# Patient Record
Sex: FEMALE | Race: WHITE | NOT HISPANIC OR LATINO | Employment: OTHER | URBAN - METROPOLITAN AREA
[De-identification: names, ages, dates, MRNs, and addresses within clinical notes are randomized per-mention and may not be internally consistent; named-entity substitution may affect disease eponyms.]

---

## 2021-08-11 ENCOUNTER — OFFICE VISIT (OUTPATIENT)
Dept: URGENT CARE | Facility: CLINIC | Age: 76
End: 2021-08-11
Payer: MEDICARE

## 2021-08-11 ENCOUNTER — APPOINTMENT (OUTPATIENT)
Dept: RADIOLOGY | Facility: CLINIC | Age: 76
End: 2021-08-11
Payer: MEDICARE

## 2021-08-11 VITALS
DIASTOLIC BLOOD PRESSURE: 81 MMHG | BODY MASS INDEX: 23.46 KG/M2 | RESPIRATION RATE: 18 BRPM | WEIGHT: 146 LBS | OXYGEN SATURATION: 97 % | TEMPERATURE: 96.8 F | SYSTOLIC BLOOD PRESSURE: 182 MMHG | HEIGHT: 66 IN | HEART RATE: 69 BPM

## 2021-08-11 DIAGNOSIS — S76.211A STRAIN OF GROIN, RIGHT, INITIAL ENCOUNTER: Primary | ICD-10-CM

## 2021-08-11 DIAGNOSIS — S39.93XA PELVIC INJURY, INITIAL ENCOUNTER: ICD-10-CM

## 2021-08-11 PROCEDURE — 99203 OFFICE O/P NEW LOW 30 MIN: CPT | Performed by: PHYSICIAN ASSISTANT

## 2021-08-11 PROCEDURE — 73502 X-RAY EXAM HIP UNI 2-3 VIEWS: CPT

## 2021-08-11 RX ORDER — LEVOCETIRIZINE DIHYDROCHLORIDE 2.5 MG/5ML
2.5 SOLUTION ORAL EVERY EVENING
COMMUNITY
End: 2021-09-03

## 2021-08-11 RX ORDER — LEVOTHYROXINE SODIUM 0.07 MG/1
75 TABLET ORAL DAILY
COMMUNITY

## 2021-08-11 NOTE — PROGRESS NOTES
[id number] [email or web address]        NAME: Virl Sandifer is a 78 y o  female  : 1945    MRN: [id number]  DATE: 2021  TIME: 5:57 PM    Assessment and Plan   Strain of groin, right, initial encounter [S76 211A]  1  Strain of groin, right, initial encounter  XR hip/pelv 2-3 vws right if performed    CANCELED: XR pelvis complete 3+ vw         Patient Instructions     Patient Instructions   Xray is negative for a fracture or dislocation today  Can apply ice to the are, rest, elevate and take ibuprofen or tylenol for the pain  Do gentle daily stretching to improve strength  Follow up with primary care doctor if pain persists  If symptoms worsen proceed to the ER  Follow up with PCP in 3-5 days  Proceed to  ER if symptoms worsen  Chief Complaint     Chief Complaint   Patient presents with    Pelvic Pain     fall on rt side has rt pelvic pain that prohibits her from normal ambulation  History of Present Illness       77 y/o female presents with right sided hip and groin pain after a mechanical fall about 7 days ago  Pt notes she was walking and abruptly changed direction and tripped over her sandals falling on her right side  Pt notes she did hit her head but not hard  Denies headache, blurred vision, dizziness and does not take blood thinners  Pt notes it is painful to apply pressure  She cannot rotate her hip or lift her leg with out extreme pain  Pt has been using ice with relief  Tylenol does not improve symptoms  Pt tried voltaren gel on the area and noticed an improvement but thought it was in her head  No numbness or tingling in her extremity, no loss of bowel or bladder control  Review of Systems   Review of Systems   Constitutional: Negative for fever  Eyes: Negative for visual disturbance  Respiratory: Negative for cough, chest tightness and shortness of breath  Cardiovascular: Negative for chest pain and palpitations  Genitourinary: Negative for difficulty urinating  Musculoskeletal: Positive for gait problem (pain)  Negative for back pain and neck pain  Right groin pain   Skin: Positive for color change (bruise)  Neurological: Positive for weakness  Negative for dizziness, light-headedness and headaches  Current Medications       Current Outpatient Medications:     buPROPion HCl (WELLBUTRIN PO), Take by mouth, Disp: , Rfl:     levocetirizine (XYZAL) 2 5 MG/5ML solution, Take 2 5 mg by mouth every evening, Disp: , Rfl:     levothyroxine 75 mcg tablet, Take 75 mcg by mouth daily, Disp: , Rfl:     sertraline (ZOLOFT) 50 mg tablet, Take 50 mg by mouth daily, Disp: , Rfl:     Current Allergies     Allergies as of 08/11/2021 - Reviewed 08/11/2021   Allergen Reaction Noted    Cleocin [clindamycin] Hives 08/11/2021            The following portions of the patient's history were reviewed and updated as appropriate: allergies, current medications, past family history, past medical history, past social history, past surgical history and problem list      No past medical history on file  Past Surgical History:   Procedure Laterality Date    ADENOIDECTOMY      APPENDECTOMY      BARIATRIC SURGERY      BILATERAL OOPHORECTOMY      CATARACT EXTRACTION, BILATERAL      CHOLECYSTECTOMY      ELBOW SURGERY      HERNIA REPAIR      HYSTERECTOMY      MASTECTOMY      RESECTION RIBS EXTRAPLEURAL      TONSILLECTOMY         No family history on file  Medications have been verified  Objective   BP (!) 182/81   Pulse 69   Temp (!) 96 8 °F (36 °C)   Resp 18   Ht 5' 6" (1 676 m)   Wt 66 2 kg (146 lb)   SpO2 97%   BMI 23 57 kg/m²        Physical Exam     Physical Exam  Vitals and nursing note reviewed  Constitutional:       Appearance: Normal appearance  HENT:      Head: Normocephalic and atraumatic  Cardiovascular:      Rate and Rhythm: Normal rate and regular rhythm  Pulses: Normal pulses  Heart sounds: Normal heart sounds     Pulmonary: Effort: Pulmonary effort is normal       Breath sounds: Normal breath sounds  Musculoskeletal:         General: Tenderness (deep right groin) present  No swelling  Right upper leg: Tenderness (inguinal region) present  Legs:       Comments: Decreased ROM d/t pain in all directions  Hip flexion 2/5  Hip add/abduction 2/5   N/V intact   Skin:     Findings: Bruising (lateral right thigh) present  Neurological:      Mental Status: She is alert and oriented to person, place, and time

## 2021-08-11 NOTE — PATIENT INSTRUCTIONS
Xray is negative for a fracture or dislocation today  Can apply ice to the are, rest, elevate and take ibuprofen or tylenol for the pain  Do gentle daily stretching to improve strength  Follow up with primary care doctor if pain persists  If symptoms worsen proceed to the ER

## 2021-09-03 ENCOUNTER — OFFICE VISIT (OUTPATIENT)
Dept: OBGYN CLINIC | Facility: CLINIC | Age: 76
End: 2021-09-03
Payer: MEDICARE

## 2021-09-03 VITALS
BODY MASS INDEX: 23.69 KG/M2 | WEIGHT: 147.4 LBS | HEART RATE: 60 BPM | HEIGHT: 66 IN | DIASTOLIC BLOOD PRESSURE: 83 MMHG | SYSTOLIC BLOOD PRESSURE: 157 MMHG

## 2021-09-03 DIAGNOSIS — S76.211A STRAIN OF RIGHT GROIN: Primary | ICD-10-CM

## 2021-09-03 PROCEDURE — 99204 OFFICE O/P NEW MOD 45 MIN: CPT | Performed by: ORTHOPAEDIC SURGERY

## 2021-09-03 RX ORDER — HYOSCYAMINE SULFATE EXTENDED-RELEASE 0.38 MG/1
0.38 TABLET ORAL DAILY
COMMUNITY
Start: 2021-08-27

## 2021-09-03 RX ORDER — CYANOCOBALAMIN 1000 UG/ML
INJECTION INTRAMUSCULAR; SUBCUTANEOUS
COMMUNITY
Start: 2021-08-27

## 2021-09-03 RX ORDER — LEVOCETIRIZINE DIHYDROCHLORIDE 5 MG/1
5 TABLET, FILM COATED ORAL EVERY EVENING
COMMUNITY
Start: 2021-07-11

## 2021-09-03 RX ORDER — BUPROPION HYDROCHLORIDE 300 MG/1
300 TABLET ORAL DAILY
COMMUNITY
Start: 2021-06-10

## 2021-09-03 RX ORDER — ALPRAZOLAM 0.25 MG/1
0.25 TABLET ORAL 3 TIMES DAILY PRN
COMMUNITY
Start: 2021-06-08

## 2021-09-03 NOTE — PROGRESS NOTES
Assessment/Plan:  1  Strain of right groin  MRI hip right wo contrast       Scribe Attestation    I,:  Rosie Ortiz am acting as a scribe while in the presence of the attending physician :       I,:  Cindi Altamirano MD personally performed the services described in this documentation    as scribed in my presence :             Upon evaluation and review of X-ray imaging obtained on 8/11/2021, Kwame Ryan demonstrates with a right groin strain  At this time, I am also suspicious of a hairline fracture  A STAT MRI has been ordered to rule out a hairline fracture, and Kwame Ryan will follow-up with me shortly after to discuss the results  A surgical repair may be needed if a fracture is detected, which will be discussed in further detail with Kwame Ryan if necessary  Subjective:   Farzad Hernandez is a friendly 78 y o  female who presents to the office today for pain in her right groin area, from a fall about 8 weeks ago  She reports making a sharp, unexpected turn while walking when she experienced significant pain in her groin, causing her to fall on 8/04/2021  She was worried about injuring her hip due to her age  She visited a  [facility] location and received X-ray imaging on 8/11/2021, and was told that there was no obvious hip fracture, but a possible strain of a groin muscle  She experienced pain, swelling, and tenderness over the injured area, but noticed no bruising  Kwame Ryan initially had significant pain with standing up/sitting down, driving, and walking, and now is experiencing slight discomfort with walking  She has tried rest, ice, Tylenol, and Voltaren gel for pain relief, and they have worked to an extent  She initially had to use her cane to help with ambulation, but has since weaned off of it  At this time, she is unable to walk faster than normal pace, and experiences pain with walking during the first few steps  Her pain intensity increases throughout the day during activity  Review of Systems   Constitutional: Negative for chills and fever  HENT: Negative for ear pain and sore throat  Eyes: Negative for pain and visual disturbance  Respiratory: Negative for cough and shortness of breath  Cardiovascular: Negative for chest pain and palpitations  Gastrointestinal: Negative for abdominal pain and vomiting  Genitourinary: Negative for dysuria and hematuria  Musculoskeletal: Positive for gait problem (Pain experienced in right groin area with walking, previously used cane for assistance) and myalgias  Negative for arthralgias and back pain  Skin: Negative for color change and rash  Neurological: Negative for seizures and syncope  All other systems reviewed and are negative  Past Medical History:   Diagnosis Date    Depression     Disease of thyroid gland        Past Surgical History:   Procedure Laterality Date    ADENOIDECTOMY      APPENDECTOMY      BARIATRIC SURGERY      BILATERAL OOPHORECTOMY      CATARACT EXTRACTION, BILATERAL      CHOLECYSTECTOMY      ELBOW SURGERY      HERNIA REPAIR      HYSTERECTOMY      MASTECTOMY      RESECTION RIBS EXTRAPLEURAL      TONSILLECTOMY         Family History   Problem Relation Age of Onset    No Known Problems Mother     No Known Problems Father        Social History     Occupational History    Not on file   Tobacco Use    Smoking status: Never Smoker    Smokeless tobacco: Never Used   Vaping Use    Vaping Use: Never used   Substance and Sexual Activity    Alcohol use:  Yes    Drug use: Never    Sexual activity: Not on file         Current Outpatient Medications:     ALPRAZolam (XANAX) 0 25 mg tablet, Take 0 25 mg by mouth 3 (three) times a day as needed, Disp: , Rfl:     buPROPion (WELLBUTRIN XL) 300 mg 24 hr tablet, Take 300 mg by mouth daily, Disp: , Rfl:     cyanocobalamin 1,000 mcg/mL, INJECT 1 ML INTRAMUSCULARLY ONCE EVERY MONTH, Disp: , Rfl:     hyoscyamine (LEVBID) 0 375 mg 12 hr tablet, Take 0 375 mg by mouth daily, Disp: , Rfl:     levocetirizine (XYZAL) 5 MG tablet, Take 5 mg by mouth every evening, Disp: , Rfl:     levothyroxine 75 mcg tablet, Take 75 mcg by mouth daily, Disp: , Rfl:     sertraline (ZOLOFT) 50 mg tablet, Take 50 mg by mouth daily, Disp: , Rfl:     Allergies   Allergen Reactions    Cleocin [Clindamycin] Hives       Objective:  Vitals:    09/03/21 1007   BP: 157/83   Pulse: 60       Right Hip Exam     Tenderness   The patient is experiencing tenderness in the anterior and ischial tuberosity (Sore over iliopsoas tendon upon palpation)  Range of Motion   External rotation: 50 (Less discomfort)   Internal rotation: normal Right hip internal rotation: Soreness passively  Muscle Strength   Flexion: 5/5     Other   Erythema: absent  Sensation: normal  Pulse: present    Comments:  Positive Stinch Field Maneuver      Left Hip Exam   Left hip exam is normal             Physical Exam  Vitals reviewed  HENT:      Head: Normocephalic and atraumatic  Eyes:      General:         Right eye: No discharge  Left eye: No discharge  Extraocular Movements: Extraocular movements intact  Conjunctiva/sclera: Conjunctivae normal    Cardiovascular:      Rate and Rhythm: Normal rate  Pulmonary:      Effort: Pulmonary effort is normal  No respiratory distress  Musculoskeletal:         General: Tenderness and signs of injury present  No swelling  Cervical back: Normal range of motion and neck supple  Comments: As noted in HPI   Skin:     General: Skin is warm and dry  Neurological:      Mental Status: She is alert and oriented to person, place, and time  I have personally reviewed pertinent films in PACS and my interpretation is as follows:  X-ray imaging obtained by a [facility] location indicated no acute osseus abnormality of the right hip, with mild vascular calcification

## 2021-09-07 ENCOUNTER — HOSPITAL ENCOUNTER (OUTPATIENT)
Dept: RADIOLOGY | Facility: HOSPITAL | Age: 76
Discharge: HOME | End: 2021-09-07
Attending: ORTHOPAEDIC SURGERY
Payer: MEDICARE

## 2021-09-07 DIAGNOSIS — S76.211A STRAIN OF RIGHT GROIN: ICD-10-CM

## 2021-09-07 PROCEDURE — G1004 CDSM NDSC: HCPCS

## 2021-09-07 PROCEDURE — 73721 MRI JNT OF LWR EXTRE W/O DYE: CPT

## 2021-09-10 ENCOUNTER — OFFICE VISIT (OUTPATIENT)
Dept: OBGYN CLINIC | Facility: CLINIC | Age: 76
End: 2021-09-10
Payer: MEDICARE

## 2021-09-10 VITALS
HEIGHT: 66 IN | HEART RATE: 59 BPM | SYSTOLIC BLOOD PRESSURE: 157 MMHG | BODY MASS INDEX: 23.63 KG/M2 | WEIGHT: 147 LBS | DIASTOLIC BLOOD PRESSURE: 63 MMHG

## 2021-09-10 DIAGNOSIS — S32.511D CLOSED FRACTURE OF SUPERIOR RAMUS OF RIGHT PUBIS WITH ROUTINE HEALING, SUBSEQUENT ENCOUNTER: Primary | ICD-10-CM

## 2021-09-10 PROCEDURE — 99214 OFFICE O/P EST MOD 30 MIN: CPT | Performed by: ORTHOPAEDIC SURGERY

## 2021-09-10 NOTE — PROGRESS NOTES
Assessment/Plan:  1  Closed fracture of superior ramus of right pubis with routine healing, subsequent encounter         Scribe Attestation    I,:  Camille Esquivel MA am acting as a scribe while in the presence of the attending physician :       I,:  Lila Mueller MD personally performed the services described in this documentation    as scribed in my presence :             The MRI was reviewed with Lilian Salmeron which does demonstrate a nondisplaced subacute superior ramus fracture  I discussed with her that this is a non operative fracture and should heal well with non operative treatment  She is aware this can take 3 months or longer to heal  Patient does not have any pain with hip rotation on exam  She can flex at the hip well  She may continue to be activities as tolerated  She can continue to take Tylenol OTC as needed for pain  She may follow up with me as needed  Subjective:   Anni Marsh is a 78 y o  female who presents to the office today for follow up evaluation right hip pain  This has been ongoing since a fall which was appx 6-8 weeks ago  Patient notes continued pain to her right groin  She states Wednesday and Thursday of this week she was feeling godo in regards to pain however, states she feels as though she over did it as her pain is increased today  She states she was doing a lot of walking recently  Patient notes increased pain with prolonged walking  She has been taking Tylenol OTC for pain  She has also been using ice  Patient states she is unable to take antiinflammatories  A MRI was ordered at her last visit to evaluate for a fracture  Patient presents to the office today to review the MRI  Review of Systems   Constitutional: Negative for chills and fever  HENT: Negative for drooling and sneezing  Eyes: Negative for redness  Respiratory: Negative for cough and wheezing  Gastrointestinal: Negative for nausea and vomiting     Musculoskeletal: Negative for arthralgias, joint swelling and myalgias  Neurological: Negative for weakness and numbness  Psychiatric/Behavioral: Negative for behavioral problems  The patient is not nervous/anxious  Past Medical History:   Diagnosis Date    Depression     Disease of thyroid gland        Past Surgical History:   Procedure Laterality Date    ADENOIDECTOMY      APPENDECTOMY      BARIATRIC SURGERY      BILATERAL OOPHORECTOMY      CATARACT EXTRACTION, BILATERAL      CHOLECYSTECTOMY      ELBOW SURGERY      HERNIA REPAIR      HYSTERECTOMY      MASTECTOMY      RESECTION RIBS EXTRAPLEURAL      TONSILLECTOMY         Family History   Problem Relation Age of Onset    No Known Problems Mother     No Known Problems Father        Social History     Occupational History    Not on file   Tobacco Use    Smoking status: Never Smoker    Smokeless tobacco: Never Used   Vaping Use    Vaping Use: Never used   Substance and Sexual Activity    Alcohol use:  Yes    Drug use: Never    Sexual activity: Not on file         Current Outpatient Medications:     ALPRAZolam (XANAX) 0 25 mg tablet, Take 0 25 mg by mouth 3 (three) times a day as needed, Disp: , Rfl:     buPROPion (WELLBUTRIN XL) 300 mg 24 hr tablet, Take 300 mg by mouth daily, Disp: , Rfl:     cyanocobalamin 1,000 mcg/mL, INJECT 1 ML INTRAMUSCULARLY ONCE EVERY MONTH, Disp: , Rfl:     hyoscyamine (LEVBID) 0 375 mg 12 hr tablet, Take 0 375 mg by mouth daily, Disp: , Rfl:     levocetirizine (XYZAL) 5 MG tablet, Take 5 mg by mouth every evening, Disp: , Rfl:     levothyroxine 75 mcg tablet, Take 75 mcg by mouth daily, Disp: , Rfl:     sertraline (ZOLOFT) 50 mg tablet, Take 50 mg by mouth daily, Disp: , Rfl:     Allergies   Allergen Reactions    Cleocin [Clindamycin] Hives       Objective:  Vitals:    09/10/21 1445   BP: 157/63   Pulse: 59       Right Hip Exam     Tenderness   Right hip tenderness location: fracture site     Muscle Strength   Right hip normal muscle strength: 4+/5 hip flexion  Other   Erythema: absent  Sensation: normal  Pulse: present    Comments:  + straight leg raise  Can flex the hip well  No pain with rotation             Physical Exam  Constitutional:       Appearance: She is well-developed  HENT:      Head: Normocephalic and atraumatic  Eyes:      General:         Right eye: No discharge  Left eye: No discharge  Conjunctiva/sclera: Conjunctivae normal    Cardiovascular:      Rate and Rhythm: Normal rate  Pulmonary:      Effort: Pulmonary effort is normal  No respiratory distress  Musculoskeletal:      Cervical back: Normal range of motion and neck supple  Comments: As noted in HPI   Skin:     General: Skin is warm and dry  Neurological:      Mental Status: She is alert and oriented to person, place, and time  Psychiatric:         Behavior: Behavior normal          Thought Content: Thought content normal          Judgment: Judgment normal          I have personally reviewed pertinent films in PACS and my interpretation is as follows:MRI right hip performed on 9/7/21 demonstrates a nondisplaced subacute superior pubic ramus fracture

## 2021-10-25 ENCOUNTER — APPOINTMENT (OUTPATIENT)
Dept: RADIOLOGY | Facility: CLINIC | Age: 76
End: 2021-10-25
Payer: MEDICARE

## 2021-10-25 ENCOUNTER — OFFICE VISIT (OUTPATIENT)
Dept: OBGYN CLINIC | Facility: CLINIC | Age: 76
End: 2021-10-25
Payer: MEDICARE

## 2021-10-25 VITALS
HEART RATE: 56 BPM | DIASTOLIC BLOOD PRESSURE: 69 MMHG | HEIGHT: 66 IN | WEIGHT: 148 LBS | SYSTOLIC BLOOD PRESSURE: 158 MMHG | BODY MASS INDEX: 23.78 KG/M2

## 2021-10-25 DIAGNOSIS — M25.551 PAIN IN RIGHT HIP: ICD-10-CM

## 2021-10-25 DIAGNOSIS — M70.61 TROCHANTERIC BURSITIS OF RIGHT HIP: Primary | ICD-10-CM

## 2021-10-25 DIAGNOSIS — R10.2 PELVIC PAIN: ICD-10-CM

## 2021-10-25 DIAGNOSIS — M54.31 SCIATICA OF RIGHT SIDE: ICD-10-CM

## 2021-10-25 PROCEDURE — 99214 OFFICE O/P EST MOD 30 MIN: CPT | Performed by: ORTHOPAEDIC SURGERY

## 2021-10-25 PROCEDURE — 73502 X-RAY EXAM HIP UNI 2-3 VIEWS: CPT

## 2021-10-25 PROCEDURE — 20610 DRAIN/INJ JOINT/BURSA W/O US: CPT | Performed by: ORTHOPAEDIC SURGERY

## 2021-10-25 RX ORDER — TRIAMCINOLONE ACETONIDE 40 MG/ML
20 INJECTION, SUSPENSION INTRA-ARTICULAR; INTRAMUSCULAR
Status: COMPLETED | OUTPATIENT
Start: 2021-10-25 | End: 2021-10-25

## 2021-10-25 RX ORDER — LIDOCAINE HYDROCHLORIDE 10 MG/ML
2 INJECTION, SOLUTION INFILTRATION; PERINEURAL
Status: COMPLETED | OUTPATIENT
Start: 2021-10-25 | End: 2021-10-25

## 2021-10-25 RX ADMIN — LIDOCAINE HYDROCHLORIDE 2 ML: 10 INJECTION, SOLUTION INFILTRATION; PERINEURAL at 12:12

## 2021-10-25 RX ADMIN — TRIAMCINOLONE ACETONIDE 20 MG: 40 INJECTION, SUSPENSION INTRA-ARTICULAR; INTRAMUSCULAR at 12:12

## 2021-10-27 ENCOUNTER — EVALUATION (OUTPATIENT)
Dept: PHYSICAL THERAPY | Facility: CLINIC | Age: 76
End: 2021-10-27
Payer: MEDICARE

## 2021-10-27 DIAGNOSIS — M54.16 LUMBAR RADICULOPATHY: ICD-10-CM

## 2021-10-27 DIAGNOSIS — M54.31 SCIATICA OF RIGHT SIDE: Primary | ICD-10-CM

## 2021-10-27 DIAGNOSIS — M70.61 TROCHANTERIC BURSITIS OF RIGHT HIP: ICD-10-CM

## 2021-10-27 PROCEDURE — 97161 PT EVAL LOW COMPLEX 20 MIN: CPT

## 2021-11-01 ENCOUNTER — APPOINTMENT (OUTPATIENT)
Dept: PHYSICAL THERAPY | Facility: CLINIC | Age: 76
End: 2021-11-01
Payer: MEDICARE

## 2021-11-01 ENCOUNTER — TELEPHONE (OUTPATIENT)
Dept: PHYSICAL THERAPY | Facility: CLINIC | Age: 76
End: 2021-11-01

## 2021-11-04 ENCOUNTER — OFFICE VISIT (OUTPATIENT)
Dept: PHYSICAL THERAPY | Facility: CLINIC | Age: 76
End: 2021-11-04
Payer: MEDICARE

## 2021-11-04 DIAGNOSIS — M70.61 TROCHANTERIC BURSITIS OF RIGHT HIP: Primary | ICD-10-CM

## 2021-11-04 DIAGNOSIS — M54.16 LUMBAR RADICULOPATHY: ICD-10-CM

## 2021-11-04 DIAGNOSIS — M54.31 SCIATICA OF RIGHT SIDE: ICD-10-CM

## 2021-11-04 PROCEDURE — 97112 NEUROMUSCULAR REEDUCATION: CPT

## 2021-11-04 PROCEDURE — 97110 THERAPEUTIC EXERCISES: CPT

## 2021-11-08 ENCOUNTER — APPOINTMENT (OUTPATIENT)
Dept: PHYSICAL THERAPY | Facility: CLINIC | Age: 76
End: 2021-11-08
Payer: MEDICARE

## 2021-11-11 ENCOUNTER — OFFICE VISIT (OUTPATIENT)
Dept: PHYSICAL THERAPY | Facility: CLINIC | Age: 76
End: 2021-11-11
Payer: MEDICARE

## 2021-11-11 DIAGNOSIS — M54.16 LUMBAR RADICULOPATHY: ICD-10-CM

## 2021-11-11 DIAGNOSIS — M70.61 TROCHANTERIC BURSITIS OF RIGHT HIP: Primary | ICD-10-CM

## 2021-11-11 DIAGNOSIS — M54.31 SCIATICA OF RIGHT SIDE: ICD-10-CM

## 2021-11-11 PROCEDURE — 97112 NEUROMUSCULAR REEDUCATION: CPT

## 2021-11-11 PROCEDURE — 97110 THERAPEUTIC EXERCISES: CPT

## 2021-11-16 ENCOUNTER — APPOINTMENT (OUTPATIENT)
Dept: PHYSICAL THERAPY | Facility: CLINIC | Age: 76
End: 2021-11-16
Payer: MEDICARE

## 2021-11-18 ENCOUNTER — HOSPITAL ENCOUNTER (EMERGENCY)
Facility: HOSPITAL | Age: 76
Discharge: HOME | End: 2021-11-18
Attending: EMERGENCY MEDICINE | Admitting: EMERGENCY MEDICINE
Payer: MEDICARE

## 2021-11-18 ENCOUNTER — APPOINTMENT (EMERGENCY)
Dept: RADIOLOGY | Facility: HOSPITAL | Age: 76
End: 2021-11-18
Payer: MEDICARE

## 2021-11-18 ENCOUNTER — APPOINTMENT (OUTPATIENT)
Dept: PHYSICAL THERAPY | Facility: CLINIC | Age: 76
End: 2021-11-18
Payer: MEDICARE

## 2021-11-18 VITALS
RESPIRATION RATE: 16 BRPM | HEART RATE: 58 BPM | SYSTOLIC BLOOD PRESSURE: 161 MMHG | DIASTOLIC BLOOD PRESSURE: 70 MMHG | HEIGHT: 66 IN | OXYGEN SATURATION: 98 % | BODY MASS INDEX: 21.86 KG/M2 | TEMPERATURE: 97.4 F | WEIGHT: 136 LBS

## 2021-11-18 DIAGNOSIS — R10.9 ABDOMINAL PAIN: Primary | ICD-10-CM

## 2021-11-18 LAB
ALBUMIN SERPL BCP-MCNC: 3.7 G/DL (ref 3.5–5)
ALP SERPL-CCNC: 96 U/L (ref 46–116)
ALT SERPL W P-5'-P-CCNC: 28 U/L (ref 12–78)
ANION GAP SERPL CALCULATED.3IONS-SCNC: 8 MMOL/L (ref 4–13)
AST SERPL W P-5'-P-CCNC: 21 U/L (ref 5–45)
BASOPHILS # BLD AUTO: 0.05 THOUSANDS/ΜL (ref 0–0.1)
BASOPHILS NFR BLD AUTO: 1 % (ref 0–1)
BILIRUB SERPL-MCNC: 0.23 MG/DL (ref 0.2–1)
BILIRUB UR QL STRIP: NEGATIVE
BUN SERPL-MCNC: 11 MG/DL (ref 5–25)
CALCIUM SERPL-MCNC: 9.3 MG/DL (ref 8.3–10.1)
CHLORIDE SERPL-SCNC: 105 MMOL/L (ref 100–108)
CLARITY UR: ABNORMAL
CO2 SERPL-SCNC: 30 MMOL/L (ref 21–32)
COLOR UR: YELLOW
CREAT SERPL-MCNC: 0.98 MG/DL (ref 0.6–1.3)
EOSINOPHIL # BLD AUTO: 0.11 THOUSAND/ΜL (ref 0–0.61)
EOSINOPHIL NFR BLD AUTO: 2 % (ref 0–6)
ERYTHROCYTE [DISTWIDTH] IN BLOOD BY AUTOMATED COUNT: 16.7 % (ref 11.6–15.1)
GFR SERPL CREATININE-BSD FRML MDRD: 57 ML/MIN/1.73SQ M
GLUCOSE SERPL-MCNC: 134 MG/DL (ref 65–140)
GLUCOSE UR STRIP-MCNC: NEGATIVE MG/DL
HCT VFR BLD AUTO: 38.5 % (ref 34.8–46.1)
HGB BLD-MCNC: 12.1 G/DL (ref 11.5–15.4)
HGB UR QL STRIP.AUTO: NEGATIVE
IMM GRANULOCYTES # BLD AUTO: 0.01 THOUSAND/UL (ref 0–0.2)
IMM GRANULOCYTES NFR BLD AUTO: 0 % (ref 0–2)
KETONES UR STRIP-MCNC: ABNORMAL MG/DL
LEUKOCYTE ESTERASE UR QL STRIP: NEGATIVE
LYMPHOCYTES # BLD AUTO: 1.89 THOUSANDS/ΜL (ref 0.6–4.47)
LYMPHOCYTES NFR BLD AUTO: 34 % (ref 14–44)
MCH RBC QN AUTO: 29.2 PG (ref 26.8–34.3)
MCHC RBC AUTO-ENTMCNC: 31.4 G/DL (ref 31.4–37.4)
MCV RBC AUTO: 93 FL (ref 82–98)
MONOCYTES # BLD AUTO: 0.37 THOUSAND/ΜL (ref 0.17–1.22)
MONOCYTES NFR BLD AUTO: 7 % (ref 4–12)
NEUTROPHILS # BLD AUTO: 3.08 THOUSANDS/ΜL (ref 1.85–7.62)
NEUTS SEG NFR BLD AUTO: 56 % (ref 43–75)
NITRITE UR QL STRIP: NEGATIVE
NRBC BLD AUTO-RTO: 0 /100 WBCS
PH UR STRIP.AUTO: 5.5 [PH]
PLATELET # BLD AUTO: 149 THOUSANDS/UL (ref 149–390)
PMV BLD AUTO: 13 FL (ref 8.9–12.7)
POTASSIUM SERPL-SCNC: 4.5 MMOL/L (ref 3.5–5.3)
PROT SERPL-MCNC: 7.2 G/DL (ref 6.4–8.2)
PROT UR STRIP-MCNC: NEGATIVE MG/DL
RBC # BLD AUTO: 4.14 MILLION/UL (ref 3.81–5.12)
SODIUM SERPL-SCNC: 143 MMOL/L (ref 136–145)
SP GR UR STRIP.AUTO: >=1.03 (ref 1–1.03)
UROBILINOGEN UR QL STRIP.AUTO: 0.2 E.U./DL
WBC # BLD AUTO: 5.51 THOUSAND/UL (ref 4.31–10.16)

## 2021-11-18 PROCEDURE — 85025 COMPLETE CBC W/AUTO DIFF WBC: CPT | Performed by: EMERGENCY MEDICINE

## 2021-11-18 PROCEDURE — 74177 CT ABD & PELVIS W/CONTRAST: CPT

## 2021-11-18 PROCEDURE — 99284 EMERGENCY DEPT VISIT MOD MDM: CPT | Performed by: EMERGENCY MEDICINE

## 2021-11-18 PROCEDURE — 80053 COMPREHEN METABOLIC PANEL: CPT | Performed by: EMERGENCY MEDICINE

## 2021-11-18 PROCEDURE — 99284 EMERGENCY DEPT VISIT MOD MDM: CPT

## 2021-11-18 PROCEDURE — 81003 URINALYSIS AUTO W/O SCOPE: CPT | Performed by: EMERGENCY MEDICINE

## 2021-11-18 PROCEDURE — 36415 COLL VENOUS BLD VENIPUNCTURE: CPT | Performed by: EMERGENCY MEDICINE

## 2021-11-18 PROCEDURE — G1004 CDSM NDSC: HCPCS

## 2021-11-18 RX ADMIN — IOHEXOL 100 ML: 350 INJECTION, SOLUTION INTRAVENOUS at 12:31

## 2021-11-22 ENCOUNTER — APPOINTMENT (OUTPATIENT)
Dept: PHYSICAL THERAPY | Facility: CLINIC | Age: 76
End: 2021-11-22
Payer: MEDICARE

## 2021-11-24 ENCOUNTER — APPOINTMENT (OUTPATIENT)
Dept: PHYSICAL THERAPY | Facility: CLINIC | Age: 76
End: 2021-11-24
Payer: MEDICARE

## 2021-11-29 ENCOUNTER — APPOINTMENT (OUTPATIENT)
Dept: PHYSICAL THERAPY | Facility: CLINIC | Age: 76
End: 2021-11-29
Payer: MEDICARE

## 2024-05-14 ENCOUNTER — HOSPITAL ENCOUNTER (EMERGENCY)
Facility: HOSPITAL | Age: 79
Discharge: HOME | End: 2024-05-14
Attending: EMERGENCY MEDICINE | Admitting: EMERGENCY MEDICINE
Payer: MEDICARE

## 2024-05-14 ENCOUNTER — APPOINTMENT (EMERGENCY)
Dept: RADIOLOGY | Facility: HOSPITAL | Age: 79
End: 2024-05-14
Payer: MEDICARE

## 2024-05-14 VITALS
WEIGHT: 145.28 LBS | OXYGEN SATURATION: 98 % | BODY MASS INDEX: 23.45 KG/M2 | SYSTOLIC BLOOD PRESSURE: 204 MMHG | RESPIRATION RATE: 20 BRPM | TEMPERATURE: 97.6 F | DIASTOLIC BLOOD PRESSURE: 96 MMHG | HEART RATE: 68 BPM
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
## 2024-05-14 DIAGNOSIS — M79.602 ARM PAIN, ANTERIOR, LEFT: ICD-10-CM

## 2024-05-14 PROCEDURE — 99284 EMERGENCY DEPT VISIT MOD MDM: CPT

## 2024-05-14 PROCEDURE — 73080 X-RAY EXAM OF ELBOW: CPT

## 2024-05-14 PROCEDURE — 73030 X-RAY EXAM OF SHOULDER: CPT

## 2024-05-14 PROCEDURE — 99284 EMERGENCY DEPT VISIT MOD MDM: CPT | Performed by: EMERGENCY MEDICINE

## 2024-05-14 PROCEDURE — 73060 X-RAY EXAM OF HUMERUS: CPT

## 2024-05-15 NOTE — ED ATTENDING ATTESTATION
5/14/2024  I, Jluis Ndiaye MD, saw and evaluated the patient. I have discussed the patient with the resident/non-physician practitioner and agree with the resident's/non-physician practitioner's findings, Plan of Care, and MDM as documented in the resident's/non-physician practitioner's note, except where noted. All available labs and Radiology studies were reviewed.  I was present for key portions of any procedure(s) performed by the resident/non-physician practitioner and I was immediately available to provide assistance.       At this point I agree with the current assessment done in the Emergency Department.  I have conducted an independent evaluation of this patient a history and physical is as follows: Mechanical fall while bowling, initially had left upper extremity pain, now pain-free.  X-rays obtained in triage reviewed, no acute fracture identified.  Patient denies additional areas of pain or injury.  She denies any for pain medication in the emergency department is stable at time of discharge home.    XR shoulder 2+ views LEFT   ED Interpretation by Anamaria Childress MD (05/14 2136)   No acute fracture or dislocation noted      XR elbow 3+ views LEFT   ED Interpretation by Anamaria Childress MD (05/14 2136)   No acute fracture or dislocation noted      XR humerus LEFT   ED Interpretation by Anamaria Childress MD (05/14 2137)   No acute fracture or dislocation noted                ED Course         Critical Care Time  Procedures

## 2024-05-15 NOTE — ED PROVIDER NOTES
History  Chief Complaint   Patient presents with    Fall     Pt arrives via EMS from [facility] where pt slipped and fell. - head strike, - LOC. C/o left shoulder, elbow, and hip pain     Galilea is a 78-year-old female with a past medical history of gastric bypass who presents to the ED after a fall.  She reports that she was at a bowling alley when she stepped wrong slipped and fell onto her left arm.  She reports that she had pain in the left shoulder, humerus, and elbow.  Reports that at the time of my speaking to her she is feeling much better.  Denies head strike, loss of consciousness, chest pain, shortness of breath, dizziness, numbness or tingling in the arm.        Prior to Admission Medications   Prescriptions Last Dose Informant Patient Reported? Taking?   ALPRAZolam (XANAX) 0.25 mg tablet   Yes No   Sig: Take 0.25 mg by mouth 3 (three) times a day as needed   buPROPion (WELLBUTRIN XL) 300 mg 24 hr tablet   Yes No   Sig: Take 300 mg by mouth daily   cyanocobalamin 1,000 mcg/mL   Yes No   Sig: INJECT 1 ML INTRAMUSCULARLY ONCE EVERY MONTH   hyoscyamine (LEVBID) 0.375 mg 12 hr tablet   Yes No   Sig: Take 0.375 mg by mouth daily   levocetirizine (XYZAL) 5 MG tablet   Yes No   Sig: Take 5 mg by mouth every evening   levothyroxine 75 mcg tablet   Yes No   Sig: Take 75 mcg by mouth daily   sertraline (ZOLOFT) 50 mg tablet   Yes No   Sig: Take 50 mg by mouth daily      Facility-Administered Medications: None       Past Medical History:   Diagnosis Date    Depression     Disease of thyroid gland        Past Surgical History:   Procedure Laterality Date    ADENOIDECTOMY      APPENDECTOMY      BARIATRIC SURGERY      BILATERAL OOPHORECTOMY      CATARACT EXTRACTION, BILATERAL      CHOLECYSTECTOMY      ELBOW SURGERY      HERNIA REPAIR      HYSTERECTOMY      MASTECTOMY      RESECTION RIBS EXTRAPLEURAL      TONSILLECTOMY         Family History   Problem Relation Age of Onset    No Known Problems Mother     No Known  Problems Father      I have reviewed and agree with the history as documented.    E-Cigarette/Vaping    E-Cigarette Use Never User      E-Cigarette/Vaping Substances     Social History     Tobacco Use    Smoking status: Never    Smokeless tobacco: Never   Vaping Use    Vaping status: Never Used   Substance Use Topics    Alcohol use: Yes    Drug use: Never        Review of Systems   Constitutional:  Negative for activity change, chills and fever.   Eyes:  Negative for pain and visual disturbance.   Respiratory:  Negative for chest tightness and shortness of breath.    Cardiovascular:  Negative for chest pain.   Gastrointestinal:  Negative for abdominal pain, constipation, diarrhea, nausea and vomiting.   Genitourinary:  Negative for dysuria and hematuria.   Musculoskeletal:  Positive for joint swelling.   Skin:  Negative for rash and wound.   Neurological:  Negative for dizziness, syncope, light-headedness and headaches.   Psychiatric/Behavioral: Negative.         Physical Exam  ED Triage Vitals [05/14/24 1915]   Temperature Pulse Respirations Blood Pressure SpO2   97.6 °F (36.4 °C) 68 20 (!) 204/96 98 %      Temp Source Heart Rate Source Patient Position - Orthostatic VS BP Location FiO2 (%)   Oral Monitor -- Right leg --      Pain Score       --             Orthostatic Vital Signs  Vitals:    05/14/24 1915   BP: (!) 204/96   Pulse: 68       Physical Exam  Vitals and nursing note reviewed.   Constitutional:       Appearance: Normal appearance.   HENT:      Head: Normocephalic and atraumatic.      Right Ear: External ear normal.      Left Ear: External ear normal.      Nose: Nose normal.      Mouth/Throat:      Mouth: Mucous membranes are moist.      Pharynx: Oropharynx is clear.   Eyes:      Extraocular Movements: Extraocular movements intact.      Conjunctiva/sclera: Conjunctivae normal.   Cardiovascular:      Rate and Rhythm: Normal rate.   Pulmonary:      Effort: Pulmonary effort is normal.   Abdominal:       General: Abdomen is flat.   Musculoskeletal:         General: Normal range of motion.      Cervical back: Normal range of motion and neck supple.      Comments: Patient is moving the arm well, has some slight bruising over the left elbow.  Neurovascular intact.   Skin:     General: Skin is warm and dry.   Neurological:      General: No focal deficit present.      Mental Status: She is alert and oriented to person, place, and time.   Psychiatric:         Mood and Affect: Mood normal.         Behavior: Behavior normal.         ED Medications  Medications - No data to display    Diagnostic Studies  Results Reviewed       None                   XR shoulder 2+ views LEFT   ED Interpretation by Anamaria Childress MD (05/14 2136)   No acute fracture or dislocation noted      XR elbow 3+ views LEFT   ED Interpretation by Anamaria Childress MD (05/14 2136)   No acute fracture or dislocation noted      XR humerus LEFT   ED Interpretation by Anamaria Childress MD (05/14 2137)   No acute fracture or dislocation noted            Procedures  Procedures      ED Course  ED Course as of 05/14/24 2152   e May 14, 2024   2134 XR shoulder 2+ views LEFT   2152 XR shoulder 2+ views LEFT  No acute fracture or dislocation noted   2152 XR elbow 3+ views LEFT  No acute fracture or dislocation noted   2152 XR humerus LEFT  No acute fracture or dislocation noted                                       Medical Decision Making  Galilea is a 78-year-old female with a past medical history of gastric bypass who presents to the ED after a fall.    DDx includes but is not limited to: contusion, fracture, dislocation.    See ED course for MDM    Results shared with patient. Return precautions given and patient expresses understanding and is comfortable with discharge.        Amount and/or Complexity of Data Reviewed  Radiology: ordered and independent interpretation performed. Decision-making details documented in ED Course.          Disposition  Final  diagnoses:   Fall, initial encounter   Arm pain, anterior, left     Time reflects when diagnosis was documented in both MDM as applicable and the Disposition within this note       Time User Action Codes Description Comment    5/14/2024  9:44 PM Anamaria Childress [W19.XXXA] Fall, initial encounter     5/14/2024  9:44 PM Anamaria Childress [M79.602] Arm pain, anterior, left           ED Disposition       ED Disposition   Discharge    Condition   Stable    Date/Time   Tue May 14, 2024  9:44 PM    Comment   Galilea Garcia discharge to home/self care.                   Follow-up Information       Follow up With Specialties Details Why Contact Info    Khushi Samuel MD Family Medicine Schedule an appointment as soon as possible for a visit  Follow-up with your primary care physician to ensure proper healing of the arm. [address] [address]  [phone]              Patient's Medications   Discharge Prescriptions    No medications on file     No discharge procedures on file.    PDMP Review       None             ED Provider  Attending physically available and evaluated Galilea Garcia. I managed the patient along with the ED Attending.    Electronically Signed by           Anamaria Childress MD  05/14/24 0878

## 2024-06-18 ENCOUNTER — APPOINTMENT (EMERGENCY)
Dept: RADIOLOGY | Facility: HOSPITAL | Age: 79
End: 2024-06-18
Payer: MEDICARE

## 2024-06-18 ENCOUNTER — HOSPITAL ENCOUNTER (EMERGENCY)
Facility: HOSPITAL | Age: 79
Discharge: HOME | End: 2024-06-18
Attending: EMERGENCY MEDICINE
Payer: MEDICARE

## 2024-06-18 VITALS
TEMPERATURE: 99.3 F | BODY MASS INDEX: 21.79 KG/M2 | OXYGEN SATURATION: 96 % | HEART RATE: 68 BPM | WEIGHT: 135 LBS | SYSTOLIC BLOOD PRESSURE: 132 MMHG | RESPIRATION RATE: 16 BRPM | DIASTOLIC BLOOD PRESSURE: 84 MMHG

## 2024-06-18 DIAGNOSIS — S05.12XA PERIORBITAL CONTUSION OF LEFT EYE, INITIAL ENCOUNTER: Primary | ICD-10-CM

## 2024-06-18 PROCEDURE — 70450 CT HEAD/BRAIN W/O DYE: CPT

## 2024-06-18 PROCEDURE — 70486 CT MAXILLOFACIAL W/O DYE: CPT

## 2024-06-18 PROCEDURE — 99284 EMERGENCY DEPT VISIT MOD MDM: CPT | Performed by: EMERGENCY MEDICINE

## 2024-06-18 PROCEDURE — 99284 EMERGENCY DEPT VISIT MOD MDM: CPT

## 2024-06-18 NOTE — DISCHARGE INSTRUCTIONS
Your CT scans are negative for internal injury.  You should call your eye doctor to have the vision and back of the eye checked.

## 2024-06-18 NOTE — ED PROVIDER NOTES
History  Chief Complaint   Patient presents with    Eye Injury     Pt comes from home after hitting left eye off a cabinet when standing back up from picking up something she dropped. Denies thinners, LOC, reports left eye vision worse, but unsure if related to swelling.      79 yo female with history of left eye optic nerve problem and baseline blurry vision whacked left eye 3 days ago on cabinet and sustained injury.  No LOC.  She c/o left eye bruising and swelling and she feels like her vision is a little worse than normal.  She describes it as seeing pinwheels like when she gets a migraine.  She just saw her ophthalmologist last week and did not call her yet because she thinks her eye doctor would want her to have a CT scan.  No neck pain, nausea, vomiting, trouble walking or talking.  She does not have left eye pain but the area around her left eye is sore.      History provided by:  Patient   used: No        Prior to Admission Medications   Prescriptions Last Dose Informant Patient Reported? Taking?   ALPRAZolam (XANAX) 0.25 mg tablet   Yes No   Sig: Take 0.25 mg by mouth 3 (three) times a day as needed   buPROPion (WELLBUTRIN XL) 300 mg 24 hr tablet   Yes No   Sig: Take 300 mg by mouth daily   cyanocobalamin 1,000 mcg/mL   Yes No   Sig: INJECT 1 ML INTRAMUSCULARLY ONCE EVERY MONTH   hyoscyamine (LEVBID) 0.375 mg 12 hr tablet   Yes No   Sig: Take 0.375 mg by mouth daily   levocetirizine (XYZAL) 5 MG tablet   Yes No   Sig: Take 5 mg by mouth every evening   levothyroxine 75 mcg tablet   Yes No   Sig: Take 75 mcg by mouth daily   sertraline (ZOLOFT) 50 mg tablet   Yes No   Sig: Take 50 mg by mouth daily      Facility-Administered Medications: None       Past Medical History:   Diagnosis Date    Depression     Disease of thyroid gland        Past Surgical History:   Procedure Laterality Date    ADENOIDECTOMY      APPENDECTOMY      BARIATRIC SURGERY      BILATERAL OOPHORECTOMY      CATARACT  EXTRACTION, BILATERAL      CHOLECYSTECTOMY      ELBOW SURGERY      HERNIA REPAIR      HYSTERECTOMY      MASTECTOMY      RESECTION RIBS EXTRAPLEURAL      TONSILLECTOMY         Family History   Problem Relation Age of Onset    No Known Problems Mother     No Known Problems Father      I have reviewed and agree with the history as documented.    E-Cigarette/Vaping    E-Cigarette Use Never User      E-Cigarette/Vaping Substances     Social History     Tobacco Use    Smoking status: Never    Smokeless tobacco: Never   Vaping Use    Vaping status: Never Used   Substance Use Topics    Alcohol use: Yes    Drug use: Never       Review of Systems   Eyes:  Positive for visual disturbance. Negative for pain and redness.   Gastrointestinal:  Negative for nausea and vomiting.   Musculoskeletal:  Negative for gait problem and neck pain.   Skin:  Positive for wound.   Neurological:  Negative for syncope and headaches.       Physical Exam  Physical Exam  Vitals and nursing note reviewed.   Constitutional:       General: She is not in acute distress.     Appearance: She is well-developed. She is not ill-appearing or diaphoretic.   HENT:      Head: Normocephalic.      Comments: Left periorbital contusion  Eyes:      Extraocular Movements: Extraocular movements intact.      Conjunctiva/sclera: Conjunctivae normal.      Pupils: Pupils are equal, round, and reactive to light.   Cardiovascular:      Rate and Rhythm: Normal rate and regular rhythm.      Heart sounds: Normal heart sounds. No murmur heard.  Pulmonary:      Effort: Pulmonary effort is normal. No respiratory distress.      Breath sounds: Normal breath sounds.   Abdominal:      General: Bowel sounds are normal. There is no distension.      Palpations: Abdomen is soft.      Tenderness: There is no abdominal tenderness.   Musculoskeletal:         General: No deformity. Normal range of motion.      Cervical back: Normal range of motion and neck supple.   Skin:     General: Skin is  warm and dry.      Coloration: Skin is not pale.      Findings: No rash.   Neurological:      General: No focal deficit present.      Mental Status: She is alert and oriented to person, place, and time.      Cranial Nerves: No cranial nerve deficit.      Motor: No weakness.   Psychiatric:         Mood and Affect: Mood normal.         Behavior: Behavior normal.         Vital Signs  ED Triage Vitals [06/18/24 1418]   Temperature Pulse Respirations Blood Pressure SpO2   99.3 °F (37.4 °C) 68 16 132/84 96 %      Temp Source Heart Rate Source Patient Position - Orthostatic VS BP Location FiO2 (%)   Tympanic Monitor Sitting Right arm --      Pain Score       --           Vitals:    06/18/24 1418   BP: 132/84   Pulse: 68   Patient Position - Orthostatic VS: Sitting         Visual Acuity  Visual Acuity      Flowsheet Row Most Recent Value   Visual acuity R eye is 20/30   Visual acuity Left eye is 20/70   Visual acuity in both eyes is 20/25   Wearing corrective eyewear/lenses? Yes            ED Medications  Medications - No data to display    Diagnostic Studies  Results Reviewed       None                   CT facial bones without contrast   Final Result by Asad Ruiz MD (06/18 1520)      Normal noncontrast CT of the facial bones.               Workstation performed: [id number]         CT head without contrast   Final Result by Asad Ruiz MD (06/18 1528)      No acute intracranial abnormality.                  Workstation performed: [id number]                    Procedures  Procedures         ED Course                                             Medical Decision Making  CT scan negative for acute internal injury.  Pt. Advised she needs to call her eye doctor to have the vision checked.    Amount and/or Complexity of Data Reviewed  Radiology: ordered.             Disposition  Final diagnoses:   Periorbital contusion of left eye, initial encounter     Time reflects when diagnosis was documented in both  MDM as applicable and the Disposition within this note       Time User Action Codes Description Comment    6/18/2024  3:37 PM Kaya James Add [S05.12XA] Periorbital contusion of left eye, initial encounter           ED Disposition       ED Disposition   Discharge    Condition   Stable    Date/Time   Tue Jun 18, 2024  3:37 PM    Comment   Galilea SY Bley discharge to home/self care.                   Follow-up Information       Follow up With Specialties Details Why Contact Info    your eye doctor  Call  to be seen today or tomorrow             Patient's Medications   Discharge Prescriptions    No medications on file       No discharge procedures on file.    PDMP Review       None            ED Provider  Electronically Signed by             Kaya James MD  06/18/24 7628

## 2024-09-23 ENCOUNTER — HOSPITAL ENCOUNTER (OUTPATIENT)
Dept: RADIOLOGY | Facility: HOSPITAL | Age: 79
Discharge: HOME | End: 2024-09-23
Payer: MEDICARE

## 2024-09-23 DIAGNOSIS — R26.89 OTHER ABNORMALITIES OF GAIT AND MOBILITY: ICD-10-CM

## 2024-09-23 DIAGNOSIS — R26.81 UNSTEADY GAIT WHEN WALKING: ICD-10-CM

## 2024-09-23 PROCEDURE — 72141 MRI NECK SPINE W/O DYE: CPT

## 2024-09-23 PROCEDURE — 70551 MRI BRAIN STEM W/O DYE: CPT

## 2024-10-22 ENCOUNTER — HOSPITAL ENCOUNTER (OUTPATIENT)
Dept: RADIOLOGY | Facility: HOSPITAL | Age: 79
Discharge: HOME | End: 2024-10-22
Payer: MEDICARE

## 2024-10-22 DIAGNOSIS — R59.9 ENLARGED LYMPH NODES, UNSPECIFIED: ICD-10-CM

## 2024-10-22 PROCEDURE — 76536 US EXAM OF HEAD AND NECK: CPT

## 2025-01-13 ENCOUNTER — OFFICE VISIT (OUTPATIENT)
Dept: URGENT CARE | Facility: CLINIC | Age: 80
End: 2025-01-13
Payer: MEDICARE

## 2025-01-13 VITALS
TEMPERATURE: 98 F | OXYGEN SATURATION: 98 % | DIASTOLIC BLOOD PRESSURE: 77 MMHG | BODY MASS INDEX: 21.18 KG/M2 | RESPIRATION RATE: 18 BRPM | SYSTOLIC BLOOD PRESSURE: 168 MMHG | WEIGHT: 131.2 LBS | HEART RATE: 87 BPM

## 2025-01-13 DIAGNOSIS — M53.3 DISORDER OF SI (SACROILIAC) JOINT: Primary | ICD-10-CM

## 2025-01-13 PROCEDURE — 99213 OFFICE O/P EST LOW 20 MIN: CPT | Performed by: PHYSICIAN ASSISTANT

## 2025-01-13 RX ORDER — METHYLPREDNISOLONE 4 MG/1
TABLET ORAL
Qty: 21 TABLET | Refills: 0 | Status: SHIPPED | OUTPATIENT
Start: 2025-01-13

## 2025-01-13 RX ORDER — LIDOCAINE 50 MG/G
1 PATCH TOPICAL DAILY
Qty: 30 PATCH | Refills: 0 | Status: SHIPPED | OUTPATIENT
Start: 2025-01-13

## 2025-01-13 RX ORDER — NIFEDIPINE 30 MG
30 TABLET, EXTENDED RELEASE ORAL DAILY
COMMUNITY
Start: 2024-11-11

## 2025-01-13 NOTE — PATIENT INSTRUCTIONS
Patient Education     Sacroiliac Joint Pain   About this topic   The spine ends in a set of 5 fused bones. They are called the sacrum. They join the pelvis at the sacroiliac joint. This is also called the SI joint. Strong bands of tissue called ligaments hold this joint together. Normally, there is very little movement at this joint. Its job is to absorb shock and take stress off of the spine. You can have SI joint pain if this joint is irritated.  What are the causes?   Sometimes, the exact cause of SI pain is unknown. It is not always known if the pain is in the joint or in the ligaments around the joint. The pain may be caused by wear and tear on the joint from arthritis. Pregnancy causes this joint to become looser. Sometimes, the pain may be caused by swelling from problems like gout or an injury. Infection or a fracture can also cause SI pain.   What can make this more likely to happen?   You are more likely to have this problem if you have had an injury to your spine, pelvis, or buttocks. Someone with a history of being pregnant a few times is more likely to have problems with her SI joint. Legs that are not the same length can cause pain as well. Some infections may cause problems with the SI joint.  What are the main signs?   Pain in the lower back or buttocks. It may also be felt in the hips or pelvis. Some people feel the pain in the groin or back of the legs. This pain is often worse with activity like climbing stairs or standing for a long time.  Numbness or tingling in the upper leg  Feeling of weakness in the leg  Stiffness  Feeling unstable when moving  How does the doctor diagnose this health problem?   The doctor will do an exam and feel around your lower back. Your doctor will have you bend and twist at the waist to see what makes the pain worse. Your doctor may have you move and push and pull on your legs to test your motion and strength. Your doctor will check if the muscles in the back or legs  are tight. Your doctor may check the feeling and reflexes in your legs.  The doctor may order:  X-ray  CT or MRI scan  Bone scan  Lab tests  Diagnostic injection ? injecting a drug into the joint to see if relief is given  How does the doctor treat this health problem?   Rest from activities that make the problem worse  Ice  Heat may be used later but not right away. Heat can make swelling worse.  Special belt worn low on the hips called an SI belt. It helps to hold the joint tightly together.  Electrical stimulation  Exercises  Chiropractic manipulation  Radiofrequency ablation ? A treatment where small nerves around the joint are burned so they are numb. This does not always work. It may only last for a few years.  Surgery may be needed if other treatment plans do not work.  Injections (cortisone)  Are there other health problems to treat?   If the problem is caused by an infection, inflammatory arthritis, or ankylosing spondylosis, these problems will need to be treated.  What drugs may be needed?   The doctor may order drugs to:  Help with pain and swelling  Fight an infection  The doctor may give you a shot to help with pain and swelling. Talk with your doctor about possible risks with this shot.   What problems could happen?   Long-term back pain  Weight gain, less muscle strength and flexibility, weaker bones  Arthritis  Need for surgery  Infection  What can be done to prevent this health problem?   Stay active and work out to keep your muscles strong and flexible. Warm up slowly and stretch before you exercise.  Use good posture.  Use proper ways to lift and bend:  Spread your feet apart so you have a good base of support. Then, bend with your knees when you  something from the ground.  When lifting and moving an object, keep your back straight. Keep the object as close to your body as possible. Do not twist. Instead, move your feet to the direction you are going.  Follow your doctor's orders about  weight lifting.  Last Reviewed Date   2020-10-12  Consumer Information Use and Disclaimer   This generalized information is a limited summary of diagnosis, treatment, and/or medication information. It is not meant to be comprehensive and should be used as a tool to help the user understand and/or assess potential diagnostic and treatment options. It does NOT include all information about conditions, treatments, medications, side effects, or risks that may apply to a specific patient. It is not intended to be medical advice or a substitute for the medical advice, diagnosis, or treatment of a health care provider based on the health care provider's examination and assessment of a patient’s specific and unique circumstances. Patients must speak with a health care provider for complete information about their health, medical questions, and treatment options, including any risks or benefits regarding use of medications. This information does not endorse any treatments or medications as safe, effective, or approved for treating a specific patient. [employer] and its affiliates disclaim any warranty or liability relating to this information or the use thereof. The use of this information is governed by the Terms of Use, available at https://www.woltersMamauwer.com/en/know/clinical-effectiveness-terms   Copyright   Copyright © 2024 [employer] and its affiliates and/or licensors. All rights reserved.

## 2025-01-13 NOTE — PROGRESS NOTES
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 Disorder of SI (sacroiliac) joint  Ambulatory referral to Orthopedic Surgery    Ambulatory referral to Physical Therapy    methylPREDNISolone 4 MG tablet therapy pack    lidocaine (Lidoderm) 5 %            Subjective:     Patient ID: Galilea Garcia is a 79 y.o. female.      Reason For Visit / Chief Complaint  Chief Complaint   Patient presents with    Back Pain     Left lower back pain radiates to mid back left side began yesterday. Sitting is painful, effects left leg         Galilea Garcia is a 79 year old female with a PMH of lumbar radiculopathy, right trochanteric bursitis, and right sided sciatic presents with left lower back pain for one day. Symptoms started yesterday morning after doing light work around the house. She was taking down her [employer] decorations. She denies any heavy lifting or inciting event, but began noticing the pain midmorning. Pain is left sided low back and will radiate up into the lower thoracic area on the left side. Pain is described as a \"dull ache\" and is a 8/10 at rest or when moving. Her symptoms increase to 11/10 when she goes to sit down, no increased pain with sitting, just the motion to sit in a chair. Symptoms decrease with laying flat. She has been taking tylenol which has been helpful with the pain. She denies numbness, tingling, weakness, or bowel/bladder incontinence.  She denies saddle anesthesia sensation.    Back Pain  Pertinent negatives include no abdominal pain, chest pain, dysuria or fever.         Past Medical History:   Diagnosis Date    Depression     Disease of thyroid gland        Past Surgical History:   Procedure Laterality Date    ADENOIDECTOMY      APPENDECTOMY      BARIATRIC SURGERY      BILATERAL OOPHORECTOMY      CATARACT EXTRACTION, BILATERAL      CHOLECYSTECTOMY      ELBOW SURGERY      HERNIA REPAIR      HYSTERECTOMY      MASTECTOMY      RESECTION RIBS EXTRAPLEURAL      TONSILLECTOMY   "       Family History   Problem Relation Age of Onset    No Known Problems Mother     No Known Problems Father        Review of Systems   Constitutional:  Negative for chills and fever.   HENT:  Negative for ear pain and sore throat.    Eyes:  Negative for pain and visual disturbance.   Respiratory:  Negative for cough and shortness of breath.    Cardiovascular:  Negative for chest pain and palpitations.   Gastrointestinal:  Negative for abdominal pain and vomiting.   Genitourinary:  Negative for dysuria and hematuria.   Musculoskeletal:  Positive for back pain. Negative for arthralgias and gait problem.   Skin:  Negative for color change and rash.   Neurological:  Negative for seizures and syncope.   All other systems reviewed and are negative.      Objective:    /77 (Patient Position: Sitting, Cuff Size: Child)   Pulse 87   Temp 98 °F (36.7 °C)   Resp 18   Wt 59.5 kg (131 lb 3.2 oz)   SpO2 98%   BMI 21.18 kg/m²     Physical Exam  Vitals and nursing note reviewed.   Constitutional:       General: She is not in acute distress.     Appearance: Normal appearance. She is not ill-appearing, toxic-appearing or diaphoretic.   HENT:      Head: Normocephalic and atraumatic.   Cardiovascular:      Rate and Rhythm: Normal rate and regular rhythm.      Pulses: Normal pulses.      Heart sounds: Normal heart sounds. No murmur heard.     No friction rub. No gallop.   Pulmonary:      Effort: Pulmonary effort is normal. No respiratory distress.      Breath sounds: Normal breath sounds. No stridor. No wheezing or rhonchi.   Musculoskeletal:         General: Tenderness present. No swelling, deformity or signs of injury. Normal range of motion.      Cervical back: Normal range of motion.      Comments: TTP over left SI joint, QL, and lumbar paraspinal muscles. Mildly TTP over lower lumbar and sacrum.     Full and uncresticted ROM, pain noted with right sided lateral bending and with bilateral rotation. Full and painful ROM  with hip flexion.     5/5 knee extension bilaterally. 4/5 hip flexion with pain, left worse than right.          Skin:     General: Skin is warm and dry.      Coloration: Skin is not jaundiced or pale.      Findings: No bruising or erythema.      Comments: No scars, bruising, or effusion in the lumbar region   Neurological:      Mental Status: She is alert.

## 2025-04-04 ENCOUNTER — APPOINTMENT (EMERGENCY)
Dept: RADIOLOGY | Facility: HOSPITAL | Age: 80
DRG: 392 | End: 2025-04-04
Payer: MEDICARE

## 2025-04-04 ENCOUNTER — HOSPITAL ENCOUNTER (INPATIENT)
Facility: HOSPITAL | Age: 80
LOS: 1 days | Discharge: HOME | DRG: 392 | End: 2025-04-06
Admitting: INTERNAL MEDICINE
Payer: MEDICARE

## 2025-04-04 DIAGNOSIS — R10.9 INTRACTABLE ABDOMINAL PAIN: Primary | ICD-10-CM

## 2025-04-04 DIAGNOSIS — R10.9 ACUTE LEFT FLANK PAIN: ICD-10-CM

## 2025-04-04 LAB
ALBUMIN SERPL BCG-MCNC: 4.4 G/DL (ref 3.5–5)
ALP SERPL-CCNC: 70 U/L (ref 34–104)
ALT SERPL W P-5'-P-CCNC: 13 U/L (ref 7–52)
ANION GAP SERPL CALCULATED.3IONS-SCNC: 10 MMOL/L (ref 4–13)
AST SERPL W P-5'-P-CCNC: 19 U/L (ref 13–39)
BACTERIA UR QL AUTO: NORMAL /HPF
BASOPHILS # BLD AUTO: 0.09 THOUSANDS/ÂΜL (ref 0–0.1)
BASOPHILS NFR BLD AUTO: 2 % (ref 0–1)
BILIRUB SERPL-MCNC: 0.44 MG/DL (ref 0.2–1)
BILIRUB UR QL STRIP: NEGATIVE
BUN SERPL-MCNC: 17 MG/DL (ref 5–25)
CALCIUM SERPL-MCNC: 9.4 MG/DL (ref 8.4–10.2)
CHLORIDE SERPL-SCNC: 103 MMOL/L (ref 96–108)
CLARITY UR: CLEAR
CO2 SERPL-SCNC: 24 MMOL/L (ref 21–32)
COLOR UR: ABNORMAL
CREAT SERPL-MCNC: 0.76 MG/DL (ref 0.6–1.3)
EOSINOPHIL # BLD AUTO: 0.28 THOUSAND/ÂΜL (ref 0–0.61)
EOSINOPHIL NFR BLD AUTO: 5 % (ref 0–6)
ERYTHROCYTE [DISTWIDTH] IN BLOOD BY AUTOMATED COUNT: 18.7 % (ref 11.6–15.1)
GFR SERPL CREATININE-BSD FRML MDRD: 75 ML/MIN/1.73SQ M
GLUCOSE SERPL-MCNC: 106 MG/DL (ref 65–140)
GLUCOSE UR STRIP-MCNC: NEGATIVE MG/DL
HCT VFR BLD AUTO: 39 % (ref 34.8–46.1)
HGB BLD-MCNC: 12.4 G/DL (ref 11.5–15.4)
HGB UR QL STRIP.AUTO: NEGATIVE
IMM GRANULOCYTES # BLD AUTO: 0.07 THOUSAND/UL (ref 0–0.2)
IMM GRANULOCYTES NFR BLD AUTO: 1 % (ref 0–2)
KETONES UR STRIP-MCNC: NEGATIVE MG/DL
LEUKOCYTE ESTERASE UR QL STRIP: ABNORMAL
LYMPHOCYTES # BLD AUTO: 2.79 THOUSANDS/ÂΜL (ref 0.6–4.47)
LYMPHOCYTES NFR BLD AUTO: 45 % (ref 14–44)
MCH RBC QN AUTO: 27.1 PG (ref 26.8–34.3)
MCHC RBC AUTO-ENTMCNC: 31.8 G/DL (ref 31.4–37.4)
MCV RBC AUTO: 85 FL (ref 82–98)
MONOCYTES # BLD AUTO: 0.49 THOUSAND/ÂΜL (ref 0.17–1.22)
MONOCYTES NFR BLD AUTO: 8 % (ref 4–12)
NEUTROPHILS # BLD AUTO: 2.33 THOUSANDS/ÂΜL (ref 1.85–7.62)
NEUTS SEG NFR BLD AUTO: 39 % (ref 43–75)
NITRITE UR QL STRIP: NEGATIVE
NON-SQ EPI CELLS URNS QL MICRO: NORMAL /HPF
NRBC BLD AUTO-RTO: 0 /100 WBCS
PH UR STRIP.AUTO: 6.5 [PH]
PLATELET # BLD AUTO: 155 THOUSANDS/UL (ref 149–390)
POTASSIUM SERPL-SCNC: 4.2 MMOL/L (ref 3.5–5.3)
PROT SERPL-MCNC: 7.4 G/DL (ref 6.4–8.4)
PROT UR STRIP-MCNC: NEGATIVE MG/DL
RBC # BLD AUTO: 4.57 MILLION/UL (ref 3.81–5.12)
RBC #/AREA URNS AUTO: NORMAL /HPF
SODIUM SERPL-SCNC: 137 MMOL/L (ref 135–147)
SP GR UR STRIP.AUTO: 1.01 (ref 1–1.03)
UROBILINOGEN UR STRIP-ACNC: <2 MG/DL
WBC # BLD AUTO: 6.05 THOUSAND/UL (ref 4.31–10.16)
WBC #/AREA URNS AUTO: NORMAL /HPF

## 2025-04-04 PROCEDURE — 80053 COMPREHEN METABOLIC PANEL: CPT

## 2025-04-04 PROCEDURE — 85025 COMPLETE CBC W/AUTO DIFF WBC: CPT

## 2025-04-04 PROCEDURE — 96374 THER/PROPH/DIAG INJ IV PUSH: CPT

## 2025-04-04 PROCEDURE — 96375 TX/PRO/DX INJ NEW DRUG ADDON: CPT

## 2025-04-04 PROCEDURE — 96361 HYDRATE IV INFUSION ADD-ON: CPT

## 2025-04-04 PROCEDURE — 74177 CT ABD & PELVIS W/CONTRAST: CPT

## 2025-04-04 PROCEDURE — 99284 EMERGENCY DEPT VISIT MOD MDM: CPT

## 2025-04-04 PROCEDURE — 81001 URINALYSIS AUTO W/SCOPE: CPT

## 2025-04-04 PROCEDURE — 36415 COLL VENOUS BLD VENIPUNCTURE: CPT

## 2025-04-04 RX ORDER — KETOROLAC TROMETHAMINE 30 MG/ML
15 INJECTION, SOLUTION INTRAMUSCULAR; INTRAVENOUS ONCE
Status: COMPLETED | OUTPATIENT
Start: 2025-04-04 | End: 2025-04-04

## 2025-04-04 RX ORDER — DICYCLOMINE HYDROCHLORIDE 10 MG/1
10 CAPSULE ORAL AS NEEDED
COMMUNITY

## 2025-04-04 RX ORDER — HYDROMORPHONE HCL/PF 1 MG/ML
0.5 SYRINGE (ML) INJECTION ONCE
Status: COMPLETED | OUTPATIENT
Start: 2025-04-04 | End: 2025-04-04

## 2025-04-04 RX ORDER — MULTIVITAMIN
1 CAPSULE ORAL DAILY
COMMUNITY

## 2025-04-04 RX ORDER — MULTIVIT WITH MINERALS/LUTEIN
500 TABLET ORAL DAILY
COMMUNITY

## 2025-04-04 RX ADMIN — IOHEXOL 100 ML: 350 INJECTION, SOLUTION INTRAVENOUS at 22:45

## 2025-04-04 RX ADMIN — SODIUM CHLORIDE 1000 ML: 0.9 INJECTION, SOLUTION INTRAVENOUS at 21:34

## 2025-04-04 RX ADMIN — HYDROMORPHONE HYDROCHLORIDE 0.5 MG: 1 INJECTION, SOLUTION INTRAMUSCULAR; INTRAVENOUS; SUBCUTANEOUS at 23:36

## 2025-04-04 RX ADMIN — KETOROLAC TROMETHAMINE 15 MG: 30 INJECTION, SOLUTION INTRAMUSCULAR; INTRAVENOUS at 21:34

## 2025-04-04 NOTE — Clinical Note
Case was discussed with [facility] and the patient's admission status was agreed to be Admission Status: inpatient status to the service of [facility] .

## 2025-04-05 ENCOUNTER — APPOINTMENT (OUTPATIENT)
Dept: RADIOLOGY | Facility: HOSPITAL | Age: 80
DRG: 392 | End: 2025-04-05
Payer: MEDICARE

## 2025-04-05 PROBLEM — E03.9 HYPOTHYROIDISM: Status: ACTIVE | Noted: 2025-04-05

## 2025-04-05 PROBLEM — I10 HTN (HYPERTENSION): Status: ACTIVE | Noted: 2025-04-05

## 2025-04-05 PROBLEM — R10.9 ACUTE LEFT FLANK PAIN: Status: ACTIVE | Noted: 2025-04-05

## 2025-04-05 PROBLEM — Q44.4 CHOLEDOCHAL CYST: Status: ACTIVE | Noted: 2025-04-05

## 2025-04-05 LAB
ALBUMIN SERPL BCG-MCNC: 3.4 G/DL (ref 3.5–5)
ALP SERPL-CCNC: 55 U/L (ref 34–104)
ALT SERPL W P-5'-P-CCNC: 11 U/L (ref 7–52)
ANION GAP SERPL CALCULATED.3IONS-SCNC: 8 MMOL/L (ref 4–13)
AST SERPL W P-5'-P-CCNC: 18 U/L (ref 13–39)
BASOPHILS # BLD AUTO: 0.08 THOUSANDS/ÂΜL (ref 0–0.1)
BASOPHILS NFR BLD AUTO: 1 % (ref 0–1)
BILIRUB SERPL-MCNC: 0.29 MG/DL (ref 0.2–1)
BUN SERPL-MCNC: 15 MG/DL (ref 5–25)
CALCIUM ALBUM COR SERPL-MCNC: 8.6 MG/DL (ref 8.3–10.1)
CALCIUM SERPL-MCNC: 8.1 MG/DL (ref 8.4–10.2)
CHLORIDE SERPL-SCNC: 108 MMOL/L (ref 96–108)
CK SERPL-CCNC: 23 U/L (ref 26–192)
CO2 SERPL-SCNC: 22 MMOL/L (ref 21–32)
CREAT SERPL-MCNC: 0.68 MG/DL (ref 0.6–1.3)
EOSINOPHIL # BLD AUTO: 0.35 THOUSAND/ÂΜL (ref 0–0.61)
EOSINOPHIL NFR BLD AUTO: 6 % (ref 0–6)
ERYTHROCYTE [DISTWIDTH] IN BLOOD BY AUTOMATED COUNT: 18.5 % (ref 11.6–15.1)
GFR SERPL CREATININE-BSD FRML MDRD: 84 ML/MIN/1.73SQ M
GLUCOSE SERPL-MCNC: 108 MG/DL (ref 65–140)
HCT VFR BLD AUTO: 31.7 % (ref 34.8–46.1)
HGB BLD-MCNC: 10 G/DL (ref 11.5–15.4)
IMM GRANULOCYTES # BLD AUTO: 0.06 THOUSAND/UL (ref 0–0.2)
IMM GRANULOCYTES NFR BLD AUTO: 1 % (ref 0–2)
LYMPHOCYTES # BLD AUTO: 2.75 THOUSANDS/ÂΜL (ref 0.6–4.47)
LYMPHOCYTES NFR BLD AUTO: 48 % (ref 14–44)
MCH RBC QN AUTO: 26.9 PG (ref 26.8–34.3)
MCHC RBC AUTO-ENTMCNC: 31.5 G/DL (ref 31.4–37.4)
MCV RBC AUTO: 85 FL (ref 82–98)
MONOCYTES # BLD AUTO: 0.61 THOUSAND/ÂΜL (ref 0.17–1.22)
MONOCYTES NFR BLD AUTO: 11 % (ref 4–12)
NEUTROPHILS # BLD AUTO: 1.93 THOUSANDS/ÂΜL (ref 1.85–7.62)
NEUTS SEG NFR BLD AUTO: 33 % (ref 43–75)
NRBC BLD AUTO-RTO: 0 /100 WBCS
PLATELET # BLD AUTO: 113 THOUSANDS/UL (ref 149–390)
POTASSIUM SERPL-SCNC: 3.8 MMOL/L (ref 3.5–5.3)
PROT SERPL-MCNC: 5.6 G/DL (ref 6.4–8.4)
RBC # BLD AUTO: 3.72 MILLION/UL (ref 3.81–5.12)
SODIUM SERPL-SCNC: 138 MMOL/L (ref 135–147)
WBC # BLD AUTO: 5.78 THOUSAND/UL (ref 4.31–10.16)

## 2025-04-05 PROCEDURE — 80053 COMPREHEN METABOLIC PANEL: CPT

## 2025-04-05 PROCEDURE — 85025 COMPLETE CBC W/AUTO DIFF WBC: CPT

## 2025-04-05 PROCEDURE — 82550 ASSAY OF CK (CPK): CPT | Performed by: INTERNAL MEDICINE

## 2025-04-05 PROCEDURE — 96376 TX/PRO/DX INJ SAME DRUG ADON: CPT

## 2025-04-05 PROCEDURE — 99233 SBSQ HOSP IP/OBS HIGH 50: CPT | Performed by: INTERNAL MEDICINE

## 2025-04-05 PROCEDURE — 87086 URINE CULTURE/COLONY COUNT: CPT

## 2025-04-05 PROCEDURE — 76775 US EXAM ABDO BACK WALL LIM: CPT

## 2025-04-05 RX ORDER — ACETAMINOPHEN 325 MG/1
325 TABLET ORAL EVERY 8 HOURS PRN
Status: DISCONTINUED | OUTPATIENT
Start: 2025-04-05 | End: 2025-04-06 | Stop reason: HOSPADM

## 2025-04-05 RX ORDER — MOMETASONE FUROATE 1 MG/G
1 CREAM TOPICAL DAILY
COMMUNITY

## 2025-04-05 RX ORDER — LIDOCAINE 50 MG/G
1 PATCH TOPICAL DAILY
Status: DISCONTINUED | OUTPATIENT
Start: 2025-04-05 | End: 2025-04-06 | Stop reason: HOSPADM

## 2025-04-05 RX ORDER — ASCORBIC ACID 500 MG
500 TABLET ORAL DAILY
Status: DISCONTINUED | OUTPATIENT
Start: 2025-04-05 | End: 2025-04-06 | Stop reason: HOSPADM

## 2025-04-05 RX ORDER — CEFTRIAXONE 1 G/50ML
1000 INJECTION, SOLUTION INTRAVENOUS EVERY 24 HOURS
Status: DISCONTINUED | OUTPATIENT
Start: 2025-04-05 | End: 2025-04-05

## 2025-04-05 RX ORDER — GABAPENTIN 100 MG/1
100 CAPSULE ORAL 2 TIMES DAILY
Status: DISCONTINUED | OUTPATIENT
Start: 2025-04-05 | End: 2025-04-06

## 2025-04-05 RX ORDER — HYOSCYAMINE SULFATE 0.38 MG/1
0.38 TABLET, EXTENDED RELEASE ORAL DAILY
Status: DISCONTINUED | OUTPATIENT
Start: 2025-04-05 | End: 2025-04-06 | Stop reason: HOSPADM

## 2025-04-05 RX ORDER — ACETAMINOPHEN 325 MG/1
650 TABLET ORAL EVERY 6 HOURS PRN
Status: DISCONTINUED | OUTPATIENT
Start: 2025-04-05 | End: 2025-04-05

## 2025-04-05 RX ORDER — SODIUM CHLORIDE 9 MG/ML
75 INJECTION, SOLUTION INTRAVENOUS CONTINUOUS
Status: DISPENSED | OUTPATIENT
Start: 2025-04-05 | End: 2025-04-05

## 2025-04-05 RX ORDER — OXYCODONE HYDROCHLORIDE 5 MG/1
5 TABLET ORAL EVERY 6 HOURS PRN
Refills: 0 | Status: DISCONTINUED | OUTPATIENT
Start: 2025-04-05 | End: 2025-04-06 | Stop reason: HOSPADM

## 2025-04-05 RX ORDER — HYDROMORPHONE HCL IN WATER/PF 6 MG/30 ML
0.2 PATIENT CONTROLLED ANALGESIA SYRINGE INTRAVENOUS EVERY 4 HOURS PRN
Status: DISCONTINUED | OUTPATIENT
Start: 2025-04-05 | End: 2025-04-06 | Stop reason: HOSPADM

## 2025-04-05 RX ORDER — LEVOTHYROXINE SODIUM 75 UG/1
75 TABLET ORAL
Status: DISCONTINUED | OUTPATIENT
Start: 2025-04-05 | End: 2025-04-06 | Stop reason: HOSPADM

## 2025-04-05 RX ORDER — ALPRAZOLAM 0.25 MG
0.25 TABLET ORAL 3 TIMES DAILY PRN
Status: DISCONTINUED | OUTPATIENT
Start: 2025-04-05 | End: 2025-04-06 | Stop reason: HOSPADM

## 2025-04-05 RX ORDER — NIFEDIPINE 30 MG/1
30 TABLET, EXTENDED RELEASE ORAL DAILY
Status: DISCONTINUED | OUTPATIENT
Start: 2025-04-05 | End: 2025-04-06 | Stop reason: HOSPADM

## 2025-04-05 RX ORDER — ACETAMINOPHEN 325 MG/1
650 TABLET ORAL EVERY 6 HOURS SCHEDULED
Status: DISCONTINUED | OUTPATIENT
Start: 2025-04-05 | End: 2025-04-05

## 2025-04-05 RX ORDER — ENOXAPARIN SODIUM 100 MG/ML
40 INJECTION SUBCUTANEOUS DAILY
Status: DISCONTINUED | OUTPATIENT
Start: 2025-04-05 | End: 2025-04-06 | Stop reason: HOSPADM

## 2025-04-05 RX ORDER — HYDROMORPHONE HCL/PF 1 MG/ML
0.2 SYRINGE (ML) INJECTION ONCE
Status: COMPLETED | OUTPATIENT
Start: 2025-04-05 | End: 2025-04-05

## 2025-04-05 RX ORDER — ACETAMINOPHEN 325 MG/1
650 TABLET ORAL EVERY 6 HOURS SCHEDULED
Status: DISCONTINUED | OUTPATIENT
Start: 2025-04-05 | End: 2025-04-06 | Stop reason: HOSPADM

## 2025-04-05 RX ADMIN — CEFTRIAXONE 1000 MG: 1 INJECTION, SOLUTION INTRAVENOUS at 02:23

## 2025-04-05 RX ADMIN — HYDROMORPHONE HYDROCHLORIDE 0.2 MG: 0.2 INJECTION, SOLUTION INTRAMUSCULAR; INTRAVENOUS; SUBCUTANEOUS at 06:10

## 2025-04-05 RX ADMIN — HYOSCYAMINE SULFATE 0.38 MG: 0.38 TABLET, EXTENDED RELEASE ORAL at 09:53

## 2025-04-05 RX ADMIN — ENOXAPARIN SODIUM 40 MG: 40 INJECTION SUBCUTANEOUS at 08:40

## 2025-04-05 RX ADMIN — HYDROMORPHONE HYDROCHLORIDE 0.2 MG: 0.2 INJECTION, SOLUTION INTRAMUSCULAR; INTRAVENOUS; SUBCUTANEOUS at 10:16

## 2025-04-05 RX ADMIN — LIDOCAINE 5% 1 PATCH: 700 PATCH TOPICAL at 11:54

## 2025-04-05 RX ADMIN — OXYCODONE HYDROCHLORIDE AND ACETAMINOPHEN 500 MG: 500 TABLET ORAL at 08:40

## 2025-04-05 RX ADMIN — Medication 2.5 MG: at 08:51

## 2025-04-05 RX ADMIN — Medication 1 TABLET: at 08:40

## 2025-04-05 RX ADMIN — GABAPENTIN 100 MG: 100 CAPSULE ORAL at 17:31

## 2025-04-05 RX ADMIN — CHOLECALCIFEROL (VITAMIN D3) 10 MCG (400 UNIT) TABLET 400 UNITS: at 08:40

## 2025-04-05 RX ADMIN — ACETAMINOPHEN 650 MG: 325 TABLET ORAL at 23:03

## 2025-04-05 RX ADMIN — SODIUM CHLORIDE 75 ML/HR: 0.9 INJECTION, SOLUTION INTRAVENOUS at 02:23

## 2025-04-05 RX ADMIN — LEVOTHYROXINE SODIUM 75 MCG: 0.07 TABLET ORAL at 05:53

## 2025-04-05 RX ADMIN — GABAPENTIN 100 MG: 100 CAPSULE ORAL at 11:54

## 2025-04-05 RX ADMIN — ACETAMINOPHEN 650 MG: 325 TABLET ORAL at 15:40

## 2025-04-05 RX ADMIN — OXYCODONE HYDROCHLORIDE 5 MG: 5 TABLET ORAL at 02:20

## 2025-04-05 RX ADMIN — Medication 2.5 MG: at 14:17

## 2025-04-05 RX ADMIN — NIFEDIPINE 30 MG: 30 TABLET, EXTENDED RELEASE ORAL at 08:40

## 2025-04-05 RX ADMIN — HYDROMORPHONE HYDROCHLORIDE 0.2 MG: 1 INJECTION, SOLUTION INTRAMUSCULAR; INTRAVENOUS; SUBCUTANEOUS at 01:08

## 2025-04-05 NOTE — PLAN OF CARE
Problem: METABOLIC, FLUID AND ELECTROLYTES - ADULT  Goal: Electrolytes maintained within normal limits  Description: INTERVENTIONS:- Monitor labs and assess patient for signs and symptoms of electrolyte imbalances- Administer electrolyte replacement as ordered- Monitor response to electrolyte replacements, including repeat lab results as appropriate- Instruct patient on fluid and nutrition as appropriate  Outcome: Progressing  Goal: Fluid balance maintained  Description: INTERVENTIONS:- Monitor labs - Monitor I/O and WT- Instruct patient on fluid and nutrition as appropriate- Assess for signs & symptoms of volume excess or deficit  Outcome: Progressing     Problem: PAIN - ADULT  Goal: Verbalizes/displays adequate comfort level or baseline comfort level  Description: Interventions:- Encourage patient to monitor pain and request assistance- Assess pain using appropriate pain scale- Administer analgesics based on type and severity of pain and evaluate response- Implement non-pharmacological measures as appropriate and evaluate response- Consider cultural and social influences on pain and pain management- Notify physician/advanced practitioner if interventions unsuccessful or patient reports new pain  Outcome: Progressing

## 2025-04-05 NOTE — ASSESSMENT & PLAN NOTE
Patient with PMHx lumbar radiculopathy, gastric bypass, longstanding history of IBS and left lower/left flank discomfort who presents with left flank pain radiating to the LLQ abd since 3 PM on 4/4. Was sitting on chair in cell phone store when pain started, tried resting at home for a few hours which did not help. In ED requiring multiple doses of IV pain medications.  Hx multiple abdominal surgeries including hysterectomy, bilateral oophorectomy, appendectomy, cholecystectomy, and hernia repair.  CT abd/pelvis with no acute findings in the abdomen or pelvis.  On my exam, patient has left CVA tenderness.  She denies any N/V/D, urinary symptoms, no dysuria, no frequency, no urgency.  Denies fevers, she does endorse chills.  Vitally stable, afebrile, no leukocytosis on admission.  CBC, CMP unremarkable.  Patient states this does not feel like her typical IBS symptoms.  UA not entirely convincing of UTI with moderate leukocytes, occasional bacteria.  Possibly pyelonephritis? vs IBS flare?  Will send for urine culture given persisting suspicion.  Start empiric IV ceftriaxone  Ordered kidney and bladder ultrasound  Urine retention protocol  Gentle IVF x 12 hours

## 2025-04-05 NOTE — PLAN OF CARE
Problem: Potential for Falls  Goal: Patient will remain free of falls  Description: INTERVENTIONS:- Educate patient/family on patient safety including physical limitations- Instruct patient to call for assistance with activity - Consult OT/PT to assist with strengthening/mobility - Keep Call bell within reach- Keep bed low and locked with side rails adjusted as appropriate- Keep care items and personal belongings within reach- Initiate and maintain comfort rounds- Make Fall Risk Sign visible to staff- Offer Toileting every 2 Hours, in advance of need- Initiate/Maintain bed alarm- Obtain necessary fall risk management equipment: socks- Apply yellow socks and bracelet for high fall risk patients- Consider moving patient to room near nurses station  Outcome: Progressing     Problem: METABOLIC, FLUID AND ELECTROLYTES - ADULT  Goal: Electrolytes maintained within normal limits  Description: INTERVENTIONS:- Monitor labs and assess patient for signs and symptoms of electrolyte imbalances- Administer electrolyte replacement as ordered- Monitor response to electrolyte replacements, including repeat lab results as appropriate- Instruct patient on fluid and nutrition as appropriate  Outcome: Progressing  Goal: Fluid balance maintained  Description: INTERVENTIONS:- Monitor labs - Monitor I/O and WT- Instruct patient on fluid and nutrition as appropriate- Assess for signs & symptoms of volume excess or deficit  Outcome: Progressing     Problem: PAIN - ADULT  Goal: Verbalizes/displays adequate comfort level or baseline comfort level  Description: Interventions:- Encourage patient to monitor pain and request assistance- Assess pain using appropriate pain scale- Administer analgesics based on type and severity of pain and evaluate response- Implement non-pharmacological measures as appropriate and evaluate response- Consider cultural and social influences on pain and pain management- Notify physician/advanced practitioner if  interventions unsuccessful or patient reports new pain  Outcome: Progressing

## 2025-04-05 NOTE — PROGRESS NOTES
Progress Note - Hospitalist   Name: Galilea Garcia 79 y.o. female I MRN: [id number]  Unit/Bed#: 2 [address] Date of Admission: 4/4/2025   Date of Service: 4/5/2025 I Hospital Day: 0    Assessment & Plan  Acute left flank pain  Patient with PMHx lumbar radiculopathy, gastric bypass, longstanding history of IBS and left lower/left flank discomfort who presents with left flank pain radiating to the LLQ abd since 3 PM on 4/4. Was sitting on chair in cell phone store when pain started, tried resting at home for a few hours which did not help. In ED requiring multiple doses of IV pain medications.  Hx multiple abdominal surgeries including hysterectomy, bilateral oophorectomy, appendectomy, cholecystectomy, and hernia repair.  CT abd/pelvis with no acute findings in the abdomen or pelvis.  She denies any N/V/D, urinary symptoms, no dysuria, no frequency, no urgency.  Denies fevers, she does endorse chills in setting of pain  .  Vitally stable, afebrile, no leukocytosis on admission.  CBC, CMP unremarkable.  Patient states this does not feel like her typical IBS symptoms.  UA not  convincing of UTI with moderate leukocytes, occasional bacteria.  Unclear etiology, possible musculoskeletal.  Optimize pain control, scheduled Tylenol, oxycodone and IV Dilaudid as needed  Unable to use NSAIDs due to history of gastric bypass, low-dose gabapentin  Topical treatment, lidocaine patch  Follow-up kidney and bladder ultrasound  Urine retention protocol  Gentle IVF x 12 hours  Hold off IV antibiotic as present  Activity as tolerated  Further workup pending progress  HTN (hypertension)  BP elevated on admission likely in setting of pain  Continue PTA nifedipine with hold parameters  Monitor with routine vital signs  Optimize pain control as above  Hypothyroidism  Continue PTA levothyroxine  Choledochal cyst  Hx documented type 1 choledochal cyst since 2013 which has been unchanged since she last saw surg/onc in 2022.    VTE  Pharmacologic Prophylaxis: VTE Score: 3 Moderate Risk (Score 3-4) - Pharmacological DVT Prophylaxis Ordered: enoxaparin (Lovenox).    Mobility:   Basic Mobility Inpatient Raw Score: 24  JH-HLM Goal: 8: Walk 250 feet or more  JH-HLM Goal achieved. Continue to encourage appropriate mobility.    Patient Centered Rounds: I performed bedside rounds with nursing staff today.   Discussions with Specialists or Other Care Team Provider: Yes    Education and Discussions with Family / Patient: Updated  (Family) at bedside.    Current Length of Stay: 0 day(s)  Current Patient Status: Observation   Certification Statement: The patient will continue to require additional inpatient hospital stay due to evaluation and treatment of left-sided abdominal pain/back pain  Discharge Plan: Anticipate discharge in 24-48 hrs to home.    Code Status: Level 1 - Full Code    Subjective   Still reports intermittent left lower back pain radiating to left lower quadrant sharp still stabbing in nature    HPI reviewed as noted, symptoms started yesterday around 3 PM without any obvious trigger.  Patient denied any  or GI symptoms change in bowel habit, dysuria, hematuria, fever, chills.  Patient also denied any injury      Objective :  Temp:  [97.5 °F (36.4 °C)-98.2 °F (36.8 °C)] 98.2 °F (36.8 °C)  HR:  [59-79] 62  BP: (168-192)/(67-86) 181/70  Resp:  [17-24] 18  SpO2:  [95 %-100 %] 97 %  O2 Device: None (Room air)    Body mass index is 20.5 kg/m².     Input and Output Summary (last 24 hours):     Intake/Output Summary (Last 24 hours) at 4/5/2025 0814  Last data filed at 4/5/2025 0607  Gross per 24 hour   Intake --   Output 50 ml   Net -50 ml       Physical Exam  Constitutional:       General: She is not in acute distress.  HENT:      Head: Normocephalic and atraumatic.   Cardiovascular:      Rate and Rhythm: Normal rate.   Pulmonary:      Effort: Pulmonary effort is normal. No respiratory distress.      Breath sounds: Normal breath  sounds. No wheezing or rales.   Abdominal:      General: Bowel sounds are normal. There is no distension.      Palpations: Abdomen is soft.      Tenderness: There is no abdominal tenderness. There is no guarding or rebound.      Comments: Tenderness over left lower quadrant diffusely without any guarding rebound or rigidity.   Musculoskeletal:      Right lower leg: No edema.      Left lower leg: No edema.      Comments: Spine nontender, tenderness left lower paraspinal region radiating to the flank.  No obvious deformity bruising ecchymosis.  Pain worsens with twisting motion.  SLR negative   Skin:     General: Skin is warm and dry.      Findings: No rash.   Neurological:      General: No focal deficit present.      Mental Status: She is alert and oriented to person, place, and time. Mental status is at baseline.      Cranial Nerves: No cranial nerve deficit.      Sensory: No sensory deficit.      Motor: No weakness.         Lines/Drains:              Lab Results: I have reviewed the following results:   Results from last 7 days   Lab Units 04/05/25  0503   WBC Thousand/uL 5.78   HEMOGLOBIN g/dL 10.0*   HEMATOCRIT % 31.7*   PLATELETS Thousands/uL 113*   SEGS PCT % 33*   LYMPHO PCT % 48*   MONO PCT % 11   EOS PCT % 6     Results from last 7 days   Lab Units 04/05/25  0503   SODIUM mmol/L 138   POTASSIUM mmol/L 3.8   CHLORIDE mmol/L 108   CO2 mmol/L 22   BUN mg/dL 15   CREATININE mg/dL 0.68   ANION GAP mmol/L 8   CALCIUM mg/dL 8.1*   ALBUMIN g/dL 3.4*   TOTAL BILIRUBIN mg/dL 0.29   ALK PHOS U/L 55   ALT U/L 11   AST U/L 18   GLUCOSE RANDOM mg/dL 108                       Recent Cultures (last 7 days):         Imaging Results Review: I reviewed radiology reports from this admission including: CT abdomen/pelvis.  Other Study Results Review: No additional pertinent studies reviewed.    Last 24 Hours Medication List:     Current Facility-Administered Medications:     acetaminophen (TYLENOL) tablet 650 mg, Q6H PRN     ALPRAZolam (XANAX) tablet 0.25 mg, TID PRN    ascorbic acid (VITAMIN C) tablet 500 mg, Daily    cefTRIAXone (ROCEPHIN) IVPB (premix in dextrose) 1,000 mg 50 mL, Q24H, Last Rate: 1,000 mg (04/05/25 0223)    Cholecalciferol (VITAMIN D3) tablet 400 Units, Daily    enoxaparin (LOVENOX) subcutaneous injection 40 mg, Daily    HYDROmorphone HCl (DILAUDID) injection 0.2 mg, Q4H PRN    hyoscyamine (LEVBID) 12 hr tablet 0.375 mg, Daily    levothyroxine tablet 75 mcg, Early Morning    multivitamin stress formula tablet 1 tablet, Daily    NIFEdipine (PROCARDIA XL) 24 hr tablet 30 mg, Daily    oxyCODONE (ROXICODONE) split tablet 2.5 mg, Q4H PRN **OR** oxyCODONE (ROXICODONE) IR tablet 5 mg, Q6H PRN    sodium chloride 0.9 % infusion, Continuous, Last Rate: 75 mL/hr (04/05/25 0223)    Administrative Statements   Today, Patient Was Seen By: Parth Castañeda MD  I have spent a total time of 55 minutes in caring for this patient on the day of the visit/encounter including Diagnostic results, Instructions for management, Patient and family education, Impressions, Counseling / Coordination of care, Documenting in the medical record, Reviewing/placing orders in the medical record (including tests, medications, and/or procedures), Obtaining or reviewing history  , and Communicating with other healthcare professionals .    **Please Note: This note may have been constructed using a voice recognition system.**

## 2025-04-05 NOTE — ASSESSMENT & PLAN NOTE
Hx documented type 1 choledochal cyst since 2013 which has been unchanged since she last saw surg/onc in 2022.

## 2025-04-05 NOTE — ASSESSMENT & PLAN NOTE
BP elevated on admission  Continue PTA nifedipine with hold parameters  Monitor with routine vital signs

## 2025-04-05 NOTE — ED PROVIDER NOTES
Time reflects when diagnosis was documented in both MDM as applicable and the Disposition within this note       Time User Action Codes Description Comment    4/5/2025 12:50 AM Anuj Keen Add [R10.9] Intractable abdominal pain           ED Disposition       ED Disposition   Admit    Condition   Stable    Date/Time   Sat Apr 5, 2025  1:17 AM    Comment   Case was discussed with [facility] and the patient's admission status was agreed to be Admission Status: observation status to the service of [facility] .               Assessment & Plan       Medical Decision Making  79-year-old female present emerged from due to acute onset left flank pain rating into the left lower quadrant.  Consistent with potential kidney stone. Could be other intraabdominal pathology. Labs and CT obtained. Dose of toradol given with improvement.     While waiting for CT results, patient's pain returned, given dose of dilaudid. Labs otherwise without change in kidney function, signs of UTI, or other emergent findings at this time.     CT unremarkable per radiology read. Unclear etiology for pain which continues to return with patient unable to find comfortable position. Remains in L flank and LLQ. Given intractable pain, will obs for pain management and further evaluation.     Amount and/or Complexity of Data Reviewed  Labs: ordered.  Radiology: ordered.    Risk  Prescription drug management.  Decision regarding hospitalization.             Medications   sodium chloride 0.9 % bolus 1,000 mL (0 mL Intravenous Stopped 4/4/25 2336)   ketorolac (TORADOL) injection 15 mg (15 mg Intravenous Given 4/4/25 2134)   iohexol (OMNIPAQUE) 350 MG/ML injection (MULTI-DOSE) 100 mL (100 mL Intravenous Given 4/4/25 2245)   HYDROmorphone (DILAUDID) injection 0.5 mg (0.5 mg Intravenous Given 4/4/25 2336)   HYDROmorphone (DILAUDID) injection 0.2 mg (0.2 mg Intravenous Given 4/5/25 0108)       ED Risk Strat Scores                            SBIRT 20yo+      Flowsheet Row  Most Recent Value   Initial Alcohol Screen: US AUDIT-C     1. How often do you have a drink containing alcohol? 0 Filed at: 04/05/2025 0111   2. How many drinks containing alcohol do you have on a typical day you are drinking?  0 Filed at: 04/05/2025 0111   3b. FEMALE Any Age, or MALE 65+: How often do you have 4 or more drinks on one occassion? 0 Filed at: 04/05/2025 0111   Audit-C Score 0 Filed at: 04/05/2025 0111   THALIA: How many times in the past year have you...    Used an illegal drug or used a prescription medication for non-medical reasons? Never Filed at: 04/05/2025 0111                            History of Present Illness       Chief Complaint   Patient presents with    Abdominal Pain     LLQ and L back pain since 3pm       Past Medical History:   Diagnosis Date    Depression     Disease of thyroid gland       Past Surgical History:   Procedure Laterality Date    ADENOIDECTOMY      APPENDECTOMY      BARIATRIC SURGERY      BILATERAL OOPHORECTOMY      CATARACT EXTRACTION, BILATERAL      CHOLECYSTECTOMY      ELBOW SURGERY      HERNIA REPAIR      HYSTERECTOMY      MASTECTOMY      RESECTION RIBS EXTRAPLEURAL      TONSILLECTOMY        Family History   Problem Relation Age of Onset    No Known Problems Mother     No Known Problems Father       Social History     Tobacco Use    Smoking status: Never    Smokeless tobacco: Never   Vaping Use    Vaping status: Never Used   Substance Use Topics    Alcohol use: Yes    Drug use: Never      E-Cigarette/Vaping    E-Cigarette Use Never User       E-Cigarette/Vaping Substances      I have reviewed and agree with the history as documented.     79-year-old female presenting emergency ferment due to acute onset left flank pain rating down to the left lower quadrant.  Patient notes that start about 3 PM.  Has not been able to find a comfortable position, pain is worse when she tries to lay down.  Tonight she attempted to go to sleep to see if it would improve by the morning,  but had worsening pain while laying down so she is decided to come to emergency department for evaluation.  Other than the pain itself, patient denies any other associated symptoms.  Denies fevers, chills, nausea, vomiting, chest pain, shortness of breath, other abdominal pain, diarrhea, urinary symptoms, or others.        Review of Systems   All other systems reviewed and are negative.          Objective       ED Triage Vitals [04/04/25 2042]   Temperature Pulse Blood Pressure Respirations SpO2 Patient Position - Orthostatic VS   98.1 °F (36.7 °C) 66 (!) 192/86 (!) 24 100 % Sitting      Temp Source Heart Rate Source BP Location FiO2 (%) Pain Score    Tympanic Monitor Right arm -- 10 - Worst Possible Pain      Vitals      Date and Time Temp Pulse SpO2 Resp BP Pain Score FACES Pain Rating User   04/05/25 0108 -- -- -- -- -- 5 -- AM   04/05/25 0030 -- 60 99 % -- 173/77 -- -- AM   04/05/25 0001 -- -- -- -- -- 5 -- AM   04/05/25 0000 -- 62 99 % -- 189/84 -- -- AM   04/04/25 2336 -- -- -- -- -- 8 -- AM   04/04/25 2330 -- 59 99 % 20 169/75 -- -- AM   04/04/25 2215 -- -- -- -- -- 3 -- AM   04/04/25 2134 -- -- -- -- -- 8 -- AM   04/04/25 2042 98.1 °F (36.7 °C) 66 100 % 24 192/86 10 - Worst Possible Pain -- LS            Physical Exam  Vitals and nursing note reviewed.   Constitutional:       General: She is not in acute distress.     Appearance: She is well-developed.   HENT:      Head: Normocephalic and atraumatic.   Eyes:      Conjunctiva/sclera: Conjunctivae normal.   Cardiovascular:      Rate and Rhythm: Normal rate and regular rhythm.      Heart sounds: No murmur heard.  Pulmonary:      Effort: Pulmonary effort is normal. No respiratory distress.      Breath sounds: Normal breath sounds.   Abdominal:      Palpations: Abdomen is soft.      Tenderness: There is abdominal tenderness (LLQ). There is left CVA tenderness. There is no right CVA tenderness.   Musculoskeletal:         General: No swelling.      Cervical back:  Neck supple.   Skin:     General: Skin is warm and dry.      Capillary Refill: Capillary refill takes less than 2 seconds.   Neurological:      Mental Status: She is alert.   Psychiatric:         Mood and Affect: Mood normal.         Results Reviewed       Procedure Component Value Units Date/Time    Urine Microscopic [[id number]]  (Normal) Collected: 04/04/25 2142    Lab Status: Final result Specimen: Urine, Clean Catch Updated: 04/04/25 2209     RBC, UA 0-1 /hpf      WBC, UA 1-2 /hpf      Epithelial Cells Occasional /hpf      Bacteria, UA Occasional /hpf     Comprehensive metabolic panel [[id number]] Collected: 04/04/25 2133    Lab Status: Final result Specimen: Blood from Arm, Left Updated: 04/04/25 2159     Sodium 137 mmol/L      Potassium 4.2 mmol/L      Chloride 103 mmol/L      CO2 24 mmol/L      ANION GAP 10 mmol/L      BUN 17 mg/dL      Creatinine 0.76 mg/dL      Glucose 106 mg/dL      Calcium 9.4 mg/dL      AST 19 U/L      ALT 13 U/L      Alkaline Phosphatase 70 U/L      Total Protein 7.4 g/dL      Albumin 4.4 g/dL      Total Bilirubin 0.44 mg/dL      eGFR 75 ml/min/1.73sq m     Narrative:      [employer] Kid[employer] guidelines for Chronic Kidney Disease (CKD):     Stage 1 with normal or high GFR (GFR > 90 mL/min/1.73 square meters)    Stage 2 Mild CKD (GFR = 60-89 mL/min/1.73 square meters)    Stage 3A Moderate CKD (GFR = 45-59 mL/min/1.73 square meters)    Stage 3B Moderate CKD (GFR = 30-44 mL/min/1.73 square meters)    Stage 4 Severe CKD (GFR = 15-29 mL/min/1.73 square meters)    Stage 5 End Stage CKD (GFR <15 mL/min/1.73 square meters)  Note: GFR calculation is accurate only with a steady state creatinine    UA w Reflex to Microscopic w Reflex to Culture [[id number]]  (Abnormal) Collected: 04/04/25 2142    Lab Status: Final result Specimen: Urine, Clean Catch Updated: 04/04/25 2148     Color, UA Light Yellow     Clarity, UA Clear     Specific Gravity, UA 1.010     pH, UA 6.5     Leukocytes, UA  Moderate     Nitrite, UA Negative     Protein, UA Negative mg/dl      Glucose, UA Negative mg/dl      Ketones, UA Negative mg/dl      Urobilinogen, UA <2.0 mg/dl      Bilirubin, UA Negative     Occult Blood, UA Negative    CBC and differential [[id number]]  (Abnormal) Collected: 04/04/25 2133    Lab Status: Final result Specimen: Blood from Arm, Left Updated: 04/04/25 2143     WBC 6.05 Thousand/uL      RBC 4.57 Million/uL      Hemoglobin 12.4 g/dL      Hematocrit 39.0 %      MCV 85 fL      MCH 27.1 pg      MCHC 31.8 g/dL      RDW 18.7 %      Platelets 155 Thousands/uL      nRBC 0 /100 WBCs      Segmented % 39 %      Immature Grans % 1 %      Lymphocytes % 45 %      Monocytes % 8 %      Eosinophils Relative 5 %      Basophils Relative 2 %      Absolute Neutrophils 2.33 Thousands/µL      Absolute Immature Grans 0.07 Thousand/uL      Absolute Lymphocytes 2.79 Thousands/µL      Absolute Monocytes 0.49 Thousand/µL      Eosinophils Absolute 0.28 Thousand/µL      Basophils Absolute 0.09 Thousands/µL             CT abdomen pelvis with contrast   Final Interpretation by Asher Ruelas DO (04/05 0056)      No acute findings in the abdomen or pelvis.         Workstation performed: [id number]             Procedures    ED Medication and Procedure Management   Prior to Admission Medications   Prescriptions Last Dose Informant Patient Reported? Taking?   ALPRAZolam (XANAX) 0.25 mg tablet More than a month  Yes No   Sig: Take 0.25 mg by mouth 3 (three) times a day as needed   Multiple Vitamin (multivitamin) capsule 4/4/2025 Morning  Yes Yes   Sig: Take 1 capsule by mouth daily   NIFEdipine ER (ADALAT CC) 30 MG 24 hr tablet 4/4/2025 Morning  Yes Yes   Sig: Take 30 mg by mouth daily   VITAMIN D, CHOLECALCIFEROL, PO 4/4/2025 Morning  Yes Yes   Sig: Take by mouth in the morning   ascorbic acid (VITAMIN C) 250 mg tablet 4/4/2025 Morning  Yes Yes   Sig: Take 500 mg by mouth daily   buPROPion (WELLBUTRIN XL) 300 mg 24 hr tablet Not Taking   Yes No   Sig: Take 300 mg by mouth daily   Patient not taking: Reported on 4/5/2025   cyanocobalamin 1,000 mcg/mL Past Month  Yes Yes   Sig: INJECT 1 ML INTRAMUSCULARLY ONCE EVERY MONTH   dicyclomine (BENTYL) 10 mg capsule 4/4/2025 Morning  Yes Yes   Sig: Take 10 mg by mouth as needed   hyoscyamine (LEVBID) 0.375 mg 12 hr tablet 4/4/2025 Morning  Yes Yes   Sig: Take 0.125 mg by mouth daily   levocetirizine (XYZAL) 5 MG tablet Not Taking  Yes No   Sig: Take 5 mg by mouth every evening   Patient not taking: Reported on 4/5/2025   levothyroxine 75 mcg tablet Past Week  Yes Yes   Sig: Take 75 mcg by mouth daily Pt states endocrinology doctor instructed to take this medication at night   lidocaine (Lidoderm) 5 % Not Taking  No No   Sig: Apply 1 patch topically over 12 hours daily Remove & Discard patch within 12 hours or as directed by MD   Patient not taking: Reported on 4/4/2025   methylPREDNISolone 4 MG tablet therapy pack Not Taking  No No   Sig: Use as directed on package   Patient not taking: Reported on 4/4/2025   mometasone (ELOCON) 0.1 % cream Past Week  Yes Yes   Sig: Apply 1 Application topically daily   sertraline (ZOLOFT) 50 mg tablet Not Taking  Yes No   Sig: Take 50 mg by mouth daily   Patient not taking: Reported on 4/5/2025      Facility-Administered Medications: None     Patient's Medications   Discharge Prescriptions    No medications on file     No discharge procedures on file.  ED SEPSIS DOCUMENTATION   Time reflects when diagnosis was documented in both MDM as applicable and the Disposition within this note       Time User Action Codes Description Comment    4/5/2025 12:50 AM Anuj Keen Add [R10.9] Intractable abdominal pain                  Anuj Keen MD  04/05/25 0120

## 2025-04-05 NOTE — H&P
H&P - Hospitalist   Name: Galilea Garcia 79 y.o. female I MRN: [id number]  Unit/Bed#: 2 [address] Date of Admission: 4/4/2025   Date of Service: 4/5/2025 I Hospital Day: 0     Assessment & Plan  Acute left flank pain  Patient with PMHx lumbar radiculopathy, gastric bypass, longstanding history of IBS and left lower/left flank discomfort who presents with left flank pain radiating to the LLQ abd since 3 PM on 4/4. Was sitting on chair in [employer] phone store when pain started, tried resting at home for a few hours which did not help. In ED requiring multiple doses of IV pain medications.  Hx multiple abdominal surgeries including hysterectomy, bilateral oophorectomy, appendectomy, cholecystectomy, and hernia repair.  CT abd/pelvis with no acute findings in the abdomen or pelvis.  On my exam, patient has left CVA tenderness.  She denies any N/V/D, urinary symptoms, no dysuria, no frequency, no urgency.  Denies fevers, she does endorse chills.  Vitally stable, afebrile, no leukocytosis on admission.  CBC, CMP unremarkable.  Patient states this does not feel like her typical IBS symptoms.  UA not entirely convincing of UTI with moderate leukocytes, occasional bacteria.  Possibly pyelonephritis? vs IBS flare?  Will send for urine culture given persisting suspicion.  Start empiric IV ceftriaxone  Ordered kidney and bladder ultrasound  Urine retention protocol  Gentle IVF x 12 hours  HTN (hypertension)  BP elevated on admission  Continue PTA nifedipine with hold parameters  Monitor with routine vital signs  Hypothyroidism  Continue PTA levothyroxine  Choledochal cyst  Hx documented type 1 choledochal cyst since 2013 which has been unchanged since she last saw surg/onc in 2022.      VTE Pharmacologic Prophylaxis: VTE Score: 3 Moderate Risk (Score 3-4) - Pharmacological DVT Prophylaxis Ordered: enoxaparin (Lovenox).  Code Status: Level 1 - Full Code   Discussion with family:  No.     Anticipated Length of Stay: Patient will be  admitted on an observation basis with an anticipated length of stay of less than 2 midnights secondary to left flank pain.    History of Present Illness   Chief Complaint: left flank pain    Galilea Garcia is a 79 y.o. female with a PMH of lumbar radiculopathy, gastric bypass, longstanding history of IBS and left lower/left flank discomfort who presents with left flank pain radiating to the LLQ since 3 PM on 4/4. Patient was having severe intractable pain in ED requiring multiple doses of IV pain medications. Hx multiple abdominal surgeries including hysterectomy, bilateral oophorectomy, appendectomy, cholecystectomy, and hernia repair. CT abd/pelvis with no acute findings in the abdomen or pelvis. On my exam, patient has left CVA tenderness.  She denies any N/V/D, urinary symptoms, no dysuria, no frequency, no urgency.  Denies fevers, she does endorse chills.  Vitally stable, afebrile, no leukocytosis on admission.  CBC, CMP unremarkable.  Patient states this does not feel like her typical IBS symptoms. UA not entirely convincing of UTI with moderate leukocytes, occasional bacteria.  Patient denies any CP, SOB, palpitations, headaches,  dizziness or lightheadedness.  Patient denies any N/V/D, or constipation.  Patient denies any appetite changes.    Review of Systems   Constitutional:  Positive for chills. Negative for fatigue and fever.   HENT:  Negative for ear pain and sore throat.    Eyes:  Negative for pain and visual disturbance.   Respiratory:  Negative for cough and shortness of breath.    Cardiovascular:  Negative for chest pain, palpitations and leg swelling.   Gastrointestinal:  Negative for abdominal distention, abdominal pain, constipation, diarrhea, nausea and vomiting.   Genitourinary:  Positive for flank pain. Negative for decreased urine volume, difficulty urinating, dysuria, frequency, hematuria, pelvic pain and urgency.   Musculoskeletal:  Negative for arthralgias and back pain.   Skin:  Negative  for color change and rash.   Neurological:  Negative for seizures and syncope.   All other systems reviewed and are negative.      Historical Information   Past Medical History:   Diagnosis Date    Depression     Disease of thyroid gland      Past Surgical History:   Procedure Laterality Date    ADENOIDECTOMY      APPENDECTOMY      BARIATRIC SURGERY      BILATERAL OOPHORECTOMY      CATARACT EXTRACTION, BILATERAL      CHOLECYSTECTOMY      ELBOW SURGERY      HERNIA REPAIR      HYSTERECTOMY      MASTECTOMY      RESECTION RIBS EXTRAPLEURAL      TONSILLECTOMY       Social History     Tobacco Use    Smoking status: Never    Smokeless tobacco: Never   Vaping Use    Vaping status: Never Used   Substance and Sexual Activity    Alcohol use: Yes    Drug use: Never    Sexual activity: Not on file     E-Cigarette/Vaping    E-Cigarette Use Never User      E-Cigarette/Vaping Substances     Family History   Problem Relation Age of Onset    No Known Problems Mother     No Known Problems Father      Social History:  Marital Status:    Occupation: retired  Patient Pre-hospital Living Situation: Home  Patient Pre-hospital Level of Mobility: walks  Patient Pre-hospital Diet Restrictions: none    Meds/Allergies   I have reviewed home medications with patient personally.  Prior to Admission medications    Medication Sig Start Date End Date Taking? Authorizing Provider   ascorbic acid (VITAMIN C) 250 mg tablet Take 500 mg by mouth daily   Yes Historical Provider, MD   cyanocobalamin 1,000 mcg/mL INJECT 1 ML INTRAMUSCULARLY ONCE EVERY MONTH 8/27/21  Yes Historical Provider, MD   dicyclomine (BENTYL) 10 mg capsule Take 10 mg by mouth as needed   Yes Historical Provider, MD   hyoscyamine (LEVBID) 0.375 mg 12 hr tablet Take 0.125 mg by mouth daily 8/27/21  Yes Historical Provider, MD   levothyroxine 75 mcg tablet Take 75 mcg by mouth daily Pt states endocrinology doctor instructed to take this medication at night   Yes Historical  Provider, MD   mometasone (ELOCON) 0.1 % cream Apply 1 Application topically daily   Yes Historical Provider, MD   Multiple Vitamin (multivitamin) capsule Take 1 capsule by mouth daily   Yes Historical Provider, MD   NIFEdipine ER (ADALAT CC) 30 MG 24 hr tablet Take 30 mg by mouth daily 11/11/24  Yes Historical Provider, MD   VITAMIN D, CHOLECALCIFEROL, PO Take by mouth in the morning   Yes Historical Provider, MD   ALPRAZolam (XANAX) 0.25 mg tablet Take 0.25 mg by mouth 3 (three) times a day as needed 6/8/21   Historical Provider, MD   buPROPion (WELLBUTRIN XL) 300 mg 24 hr tablet Take 300 mg by mouth daily  Patient not taking: Reported on 4/5/2025 6/10/21   Historical Provider, MD   levocetirizine (XYZAL) 5 MG tablet Take 5 mg by mouth every evening  Patient not taking: Reported on 4/5/2025 7/11/21   Historical Provider, MD   lidocaine (Lidoderm) 5 % Apply 1 patch topically over 12 hours daily Remove & Discard patch within 12 hours or as directed by MD  Patient not taking: Reported on 4/4/2025 1/13/25   Pricila Valladares PA-C   methylPREDNISolone 4 MG tablet therapy pack Use as directed on package  Patient not taking: Reported on 4/4/2025 1/13/25   Pricila Valladares PA-C   sertraline (ZOLOFT) 50 mg tablet Take 50 mg by mouth daily  Patient not taking: Reported on 4/5/2025    Historical Provider, MD     Allergies   Allergen Reactions    Cleocin [Clindamycin] Hives    Wound Dressing Adhesive Blisters       Objective :  Temp:  [97.5 °F (36.4 °C)-98.1 °F (36.7 °C)] 97.5 °F (36.4 °C)  HR:  [59-79] 79  BP: (168-192)/(67-86) 168/67  Resp:  [17-24] 17  SpO2:  [96 %-100 %] 96 %  O2 Device: None (Room air)    Physical Exam  Vitals and nursing note reviewed.   Constitutional:       General: She is not in acute distress.     Appearance: Normal appearance. She is well-developed. She is not ill-appearing, toxic-appearing or diaphoretic.   HENT:      Head: Normocephalic and atraumatic.      Mouth/Throat:      Mouth: Mucous membranes  "are moist.      Pharynx: Oropharynx is clear.   Eyes:      Conjunctiva/sclera: Conjunctivae normal.   Cardiovascular:      Rate and Rhythm: Normal rate and regular rhythm.      Heart sounds: No murmur heard.  Pulmonary:      Effort: Pulmonary effort is normal. No respiratory distress.      Breath sounds: Normal breath sounds. No wheezing, rhonchi or rales.   Abdominal:      General: Abdomen is flat. Bowel sounds are normal. There is no distension.      Palpations: Abdomen is soft.      Tenderness: There is abdominal tenderness in the left lower quadrant. There is left CVA tenderness. There is no right CVA tenderness or guarding.      Hernia: No hernia is present.   Musculoskeletal:         General: No swelling.      Cervical back: Neck supple.      Right lower leg: No edema.      Left lower leg: No edema.   Skin:     General: Skin is warm and dry.      Coloration: Skin is pale.   Neurological:      General: No focal deficit present.      Mental Status: She is alert and oriented to person, place, and time. Mental status is at baseline.   Psychiatric:         Mood and Affect: Mood normal.          Lines/Drains:            Lab Results: I have reviewed the following results:  Results from last 7 days   Lab Units 04/04/25  2133   WBC Thousand/uL 6.05   HEMOGLOBIN g/dL 12.4   HEMATOCRIT % 39.0   PLATELETS Thousands/uL 155   SEGS PCT % 39*   LYMPHO PCT % 45*   MONO PCT % 8   EOS PCT % 5     Results from last 7 days   Lab Units 04/04/25  2133   SODIUM mmol/L 137   POTASSIUM mmol/L 4.2   CHLORIDE mmol/L 103   CO2 mmol/L 24   BUN mg/dL 17   CREATININE mg/dL 0.76   ANION GAP mmol/L 10   CALCIUM mg/dL 9.4   ALBUMIN g/dL 4.4   TOTAL BILIRUBIN mg/dL 0.44   ALK PHOS U/L 70   ALT U/L 13   AST U/L 19   GLUCOSE RANDOM mg/dL 106             No results found for: \"HGBA1C\"        Imaging Results Review: I reviewed radiology reports from this admission including: CT abdomen/pelvis.  Other Study Results Review: No additional pertinent " studies reviewed.    Administrative Statements       ** Please Note: This note has been constructed using a voice recognition system. **

## 2025-04-05 NOTE — ASSESSMENT & PLAN NOTE
Patient with PMHx lumbar radiculopathy, gastric bypass, longstanding history of IBS and left lower/left flank discomfort who presents with left flank pain radiating to the LLQ abd since 3 PM on 4/4. Was sitting on chair in cell phone store when pain started, tried resting at home for a few hours which did not help. In ED requiring multiple doses of IV pain medications.  Hx multiple abdominal surgeries including hysterectomy, bilateral oophorectomy, appendectomy, cholecystectomy, and hernia repair.  CT abd/pelvis with no acute findings in the abdomen or pelvis.  She denies any N/V/D, urinary symptoms, no dysuria, no frequency, no urgency.  Denies fevers, she does endorse chills in setting of pain  .  Vitally stable, afebrile, no leukocytosis on admission.  CBC, CMP unremarkable.  Patient states this does not feel like her typical IBS symptoms.  UA not  convincing of UTI with moderate leukocytes, occasional bacteria.  Unclear etiology, possible musculoskeletal.  Optimize pain control, scheduled Tylenol, oxycodone and IV Dilaudid as needed  Unable to use NSAIDs due to history of gastric bypass, low-dose gabapentin  Topical treatment, lidocaine patch  Follow-up kidney and bladder ultrasound  Urine retention protocol  Gentle IVF x 12 hours  Hold off IV antibiotic as present  Activity as tolerated  Further workup pending progress

## 2025-04-05 NOTE — PLAN OF CARE
Problem: Potential for Falls  Goal: Patient will remain free of falls  Description: INTERVENTIONS:- Educate patient/family on patient safety including physical limitations- Instruct patient to call for assistance with activity - Consult OT/PT to assist with strengthening/mobility - Keep Call bell within reach- Keep bed low and locked with side rails adjusted as appropriate- Keep care items and personal belongings within reach- Initiate and maintain comfort rounds- Make Fall Risk Sign visible to staff- Offer Toileting every 2 Hours, in advance of need- Initiate/Maintain bed alarm- Obtain necessary fall risk management equipment: socks- Apply yellow socks and bracelet for high fall risk patients- Consider moving patient to room near nurses station  Outcome: Progressing     Problem: METABOLIC, FLUID AND ELECTROLYTES - ADULT  Goal: Electrolytes maintained within normal limits  Description: INTERVENTIONS:- Monitor labs and assess patient for signs and symptoms of electrolyte imbalances- Administer electrolyte replacement as ordered- Monitor response to electrolyte replacements, including repeat lab results as appropriate- Instruct patient on fluid and nutrition as appropriate  Outcome: Progressing  Goal: Fluid balance maintained  Description: INTERVENTIONS:- Monitor labs - Monitor I/O and WT- Instruct patient on fluid and nutrition as appropriate- Assess for signs & symptoms of volume excess or deficit  Outcome: Progressing     Problem: PAIN - ADULT  Goal: Verbalizes/displays adequate comfort level or baseline comfort level  Description: Interventions:- Encourage patient to monitor pain and request assistance- Assess pain using appropriate pain scale- Administer analgesics based on type and severity of pain and evaluate response- Implement non-pharmacological measures as appropriate and evaluate response- Consider cultural and social influences on pain and pain management- Notify physician/advanced practitioner if  interventions unsuccessful or patient reports new pain  Outcome: Progressing      Patient

## 2025-04-05 NOTE — ASSESSMENT & PLAN NOTE
BP elevated on admission likely in setting of pain  Continue PTA nifedipine with hold parameters  Monitor with routine vital signs  Optimize pain control as above

## 2025-04-06 VITALS
RESPIRATION RATE: 18 BRPM | WEIGHT: 127 LBS | BODY MASS INDEX: 20.41 KG/M2 | TEMPERATURE: 98.5 F | OXYGEN SATURATION: 98 % | HEIGHT: 66 IN | DIASTOLIC BLOOD PRESSURE: 54 MMHG | SYSTOLIC BLOOD PRESSURE: 161 MMHG | HEART RATE: 64 BPM

## 2025-04-06 LAB
ALBUMIN SERPL BCG-MCNC: 3.5 G/DL (ref 3.5–5)
ALP SERPL-CCNC: 52 U/L (ref 34–104)
ALT SERPL W P-5'-P-CCNC: 10 U/L (ref 7–52)
ANION GAP SERPL CALCULATED.3IONS-SCNC: 9 MMOL/L (ref 4–13)
AST SERPL W P-5'-P-CCNC: 14 U/L (ref 13–39)
BACTERIA UR CULT: NORMAL
BILIRUB SERPL-MCNC: 0.39 MG/DL (ref 0.2–1)
BUN SERPL-MCNC: 11 MG/DL (ref 5–25)
CALCIUM SERPL-MCNC: 8.7 MG/DL (ref 8.4–10.2)
CHLORIDE SERPL-SCNC: 107 MMOL/L (ref 96–108)
CO2 SERPL-SCNC: 23 MMOL/L (ref 21–32)
CREAT SERPL-MCNC: 0.6 MG/DL (ref 0.6–1.3)
ERYTHROCYTE [DISTWIDTH] IN BLOOD BY AUTOMATED COUNT: 18.4 % (ref 11.6–15.1)
GFR SERPL CREATININE-BSD FRML MDRD: 88 ML/MIN/1.73SQ M
GLUCOSE P FAST SERPL-MCNC: 101 MG/DL (ref 65–99)
GLUCOSE SERPL-MCNC: 101 MG/DL (ref 65–140)
HCT VFR BLD AUTO: 33.1 % (ref 34.8–46.1)
HGB BLD-MCNC: 10.3 G/DL (ref 11.5–15.4)
MCH RBC QN AUTO: 26.4 PG (ref 26.8–34.3)
MCHC RBC AUTO-ENTMCNC: 31.1 G/DL (ref 31.4–37.4)
MCV RBC AUTO: 85 FL (ref 82–98)
PLATELET # BLD AUTO: 123 THOUSANDS/UL (ref 149–390)
POTASSIUM SERPL-SCNC: 3.9 MMOL/L (ref 3.5–5.3)
PROT SERPL-MCNC: 5.5 G/DL (ref 6.4–8.4)
RBC # BLD AUTO: 3.9 MILLION/UL (ref 3.81–5.12)
SODIUM SERPL-SCNC: 139 MMOL/L (ref 135–147)
WBC # BLD AUTO: 4.6 THOUSAND/UL (ref 4.31–10.16)

## 2025-04-06 PROCEDURE — 80053 COMPREHEN METABOLIC PANEL: CPT | Performed by: INTERNAL MEDICINE

## 2025-04-06 PROCEDURE — 85027 COMPLETE CBC AUTOMATED: CPT | Performed by: INTERNAL MEDICINE

## 2025-04-06 PROCEDURE — 99239 HOSP IP/OBS DSCHRG MGMT >30: CPT | Performed by: INTERNAL MEDICINE

## 2025-04-06 RX ORDER — GABAPENTIN 100 MG/1
100 CAPSULE ORAL
Status: DISCONTINUED | OUTPATIENT
Start: 2025-04-06 | End: 2025-04-06 | Stop reason: HOSPADM

## 2025-04-06 RX ORDER — GABAPENTIN 100 MG/1
100 CAPSULE ORAL
Qty: 5 CAPSULE | Refills: 0 | Status: SHIPPED | OUTPATIENT
Start: 2025-04-06 | End: 2025-04-11

## 2025-04-06 RX ORDER — ACETAMINOPHEN 325 MG/1
650 TABLET ORAL EVERY 6 HOURS PRN
Start: 2025-04-06

## 2025-04-06 RX ADMIN — ENOXAPARIN SODIUM 40 MG: 40 INJECTION SUBCUTANEOUS at 08:19

## 2025-04-06 RX ADMIN — HYOSCYAMINE SULFATE 0.38 MG: 0.38 TABLET, EXTENDED RELEASE ORAL at 08:22

## 2025-04-06 RX ADMIN — ACETAMINOPHEN 650 MG: 325 TABLET ORAL at 12:22

## 2025-04-06 RX ADMIN — Medication 1 TABLET: at 08:19

## 2025-04-06 RX ADMIN — ACETAMINOPHEN 650 MG: 325 TABLET ORAL at 05:16

## 2025-04-06 RX ADMIN — LEVOTHYROXINE SODIUM 75 MCG: 0.07 TABLET ORAL at 05:16

## 2025-04-06 RX ADMIN — NIFEDIPINE 30 MG: 30 TABLET, EXTENDED RELEASE ORAL at 08:19

## 2025-04-06 RX ADMIN — LIDOCAINE 5% 1 PATCH: 700 PATCH TOPICAL at 08:19

## 2025-04-06 RX ADMIN — GABAPENTIN 100 MG: 100 CAPSULE ORAL at 08:19

## 2025-04-06 RX ADMIN — OXYCODONE HYDROCHLORIDE AND ACETAMINOPHEN 500 MG: 500 TABLET ORAL at 08:19

## 2025-04-06 RX ADMIN — CHOLECALCIFEROL (VITAMIN D3) 10 MCG (400 UNIT) TABLET 400 UNITS: at 08:19

## 2025-04-06 NOTE — PLAN OF CARE
Problem: METABOLIC, FLUID AND ELECTROLYTES - ADULT  Goal: Electrolytes maintained within normal limits  Description: INTERVENTIONS:- Monitor labs and assess patient for signs and symptoms of electrolyte imbalances- Administer electrolyte replacement as ordered- Monitor response to electrolyte replacements, including repeat lab results as appropriate- Instruct patient on fluid and nutrition as appropriate  Outcome: Progressing     Problem: PAIN - ADULT  Goal: Verbalizes/displays adequate comfort level or baseline comfort level  Description: Interventions:- Encourage patient to monitor pain and request assistance- Assess pain using appropriate pain scale- Administer analgesics based on type and severity of pain and evaluate response- Implement non-pharmacological measures as appropriate and evaluate response- Consider cultural and social influences on pain and pain management- Notify physician/advanced practitioner if interventions unsuccessful or patient reports new pain  Outcome: Progressing

## 2025-04-06 NOTE — ASSESSMENT & PLAN NOTE
Patient with PMHx lumbar radiculopathy, gastric bypass, longstanding history of IBS and left lower/left flank discomfort who presents with left flank pain radiating to the LLQ abd since 3 PM on 4/4. Was sitting on chair in [employer] phone store when pain started, tried resting at home for a few hours which did not help. In ED requiring multiple doses of IV pain medications.  Hx multiple abdominal surgeries including hysterectomy, bilateral oophorectomy, appendectomy, cholecystectomy, and hernia repair.  CT abd/pelvis with no acute findings in the abdomen or pelvis.  She denies any N/V/D, urinary symptoms, no dysuria, no frequency, no urgency.  Denies fevers, she does endorse chills in setting of pain  .  Vitally stable, afebrile, no leukocytosis on admission.  CBC, CMP unremarkable.  Patient states this does not feel like her typical IBS symptoms.  UA not  convincing of UTI with moderate leukocytes, occasional bacteria.  Unclear etiology, possible musculoskeletal.  Now resolved with multiple analgesia  Multimodal analgesia as below   scheduled Tylenol, gabapentin for short duration,   continue lidocaine patch, cold application.  Unable to use NSAIDs due to history of gastric bypass  Activity as tolerated  Advised to follow-up with PCP and primary orthopedist for further evaluation and management

## 2025-04-06 NOTE — DISCHARGE SUMMARY
Discharge Summary - Hospitalist   Name: Galilea Garcia 79 y.o. female I MRN: [id number]  Unit/Bed#: 2 [address] Date of Admission: 4/4/2025   Date of Service: 4/6/2025 I Hospital Day: 0     Assessment & Plan  Acute left flank pain  Patient with PMHx lumbar radiculopathy, gastric bypass, longstanding history of IBS and left lower/left flank discomfort who presents with left flank pain radiating to the LLQ abd since 3 PM on 4/4. Was sitting on chair in cell phone store when pain started, tried resting at home for a few hours which did not help. In ED requiring multiple doses of IV pain medications.  Hx multiple abdominal surgeries including hysterectomy, bilateral oophorectomy, appendectomy, cholecystectomy, and hernia repair.  CT abd/pelvis with no acute findings in the abdomen or pelvis.  She denies any N/V/D, urinary symptoms, no dysuria, no frequency, no urgency.  Denies fevers, she does endorse chills in setting of pain  .  Vitally stable, afebrile, no leukocytosis on admission.  CBC, CMP unremarkable.  Patient states this does not feel like her typical IBS symptoms.  UA not  convincing of UTI with moderate leukocytes, occasional bacteria.  Unclear etiology, possible musculoskeletal.  Now resolved with multiple analgesia  Multimodal analgesia as below   scheduled Tylenol, gabapentin for short duration,   continue lidocaine patch, cold application.  Unable to use NSAIDs due to history of gastric bypass  Activity as tolerated  Advised to follow-up with PCP and primary orthopedist for further evaluation and management   HTN (hypertension)  BP elevated on admission likely in setting of pain  Improving  Continue PTA nifedipine   Optimize pain control as above  Follow-up as outpatient  Hypothyroidism  Continue PTA levothyroxine  Choledochal cyst  Hx documented type 1 choledochal cyst since 2013 which has been unchanged since she last saw surg/onc in 2022.     Medical Problems       Resolved Problems  Date Reviewed:  1/13/2025   None       Discharging Physician / Practitioner: Parth Castañeda MD  PCP: Khushi Samuel MD  Admission Date:   Admission Orders (From admission, onward)       Ordered        04/06/25 0807  INPATIENT ADMISSION  Once            04/05/25 0117  Place in Observation  Once                          Discharge Date: 04/06/25    Consultations During Hospital Stay:  None    Procedures Performed:   None    Significant Findings / Test Results:   Urine culture, mixed contaminant    Incidental Findings:   None    Test Results Pending at Discharge (will require follow up):   None     Outpatient Tests Requested:  None    Complications: None    Reason for Admission: Back pain radiating to left lower quadrant    Hospital Course:   Galilea Garcia is a 79 y.o. female patient with past medical history of lumbar radiculopathy, gastric bypass, longstanding history of IBS who originally presented to the hospital on 4/4/2025 due to left lower back pain radiating to the left flank and left lower quadrant starting LLQ since 3 PM on 4/4. Patient was having severe intractable pain in ED requiring multiple doses of IV pain medications. Hx multiple abdominal surgeries including hysterectomy, bilateral oophorectomy, appendectomy, cholecystectomy, and hernia repair. CT abd/pelvis with no acute findings in the abdomen or pelvis.  Patient denied any N/V/D, urinary symptoms, no dysuria, no frequency, no urgency.  Denied fevers, she does endorse chills in setting of pain.  Vitally stable, afebrile, no leukocytosis on admission.  CBC, CMP unremarkable.  Patient stated this does not feel like her typical IBS symptoms.  Patient denies any CP, SOB, palpitations, headaches,  dizziness or lightheadedness.  Patient denied any N/V/D, or constipation/change in bowel habits, weight loss.  Patient denies any appetite changed.  Patient was admitted for observation and further evaluation and pain management.  Pain noted to be episodic sharp in nature  "starting from left lower back and radiating to the front, superficial left paraspinal and left lower quadrant tenderness noted, pain was worsening with rotational movement at the back.  No evidence of urinary tract infection noted.  Ultrasound of the kidney did not reveal any acute abnormalities.  Patient was started on multimodal pain management for musculoskeletal pain including topical lidocaine patch, ice application, Tylenol and gabapentin with significant improvement.  Lower back pain completely resolved after lidocaine patch and patient only had minimal residual left lower quadrant pain.  Patient's labs and vital signs remained stable, patient has been ambulating without any limiting symptoms.  Continues to deny any  or GI symptoms.  Tolerating diet and having regular bowel movement.  Patient will be discharged home on pain management including short course of gabapentin.  Patient was advised to monitor symptoms and seek medical attention if recurrence of symptoms, also advised to follow-up with PCP and primary orthopedic for further monitoring and workup as needed.         Please see above list of diagnoses and related plan for additional information.     Condition at Discharge: stable    Discharge Day Visit / Exam:   Subjective:    Reports feeling much better  Back pain completely resolved with lidocaine patch and cold application  Minimal left lower quadrant pain  Ambulating well in the room without any limiting symptoms or worsening of pain  Denies any fever, chills  Tolerating diet, moving bowels.  Denies any urinary difficulties.      Vitals: Blood Pressure: 161/54 (04/06/25 0819)  Pulse: 64 (04/06/25 0819)  Temperature: 98.5 °F (36.9 °C) (04/06/25 0819)  Temp Source: Oral (04/06/25 0819)  Respirations: 18 (04/06/25 0819)  Height: 5' 6\" (167.6 cm) (04/05/25 0201)  Weight - Scale: 57.6 kg (127 lb) (04/05/25 0201)  SpO2: 98 % (04/06/25 0819)  Physical Exam  Constitutional:       General: She is not in " acute distress.  HENT:      Head: Normocephalic and atraumatic.   Cardiovascular:      Rate and Rhythm: Normal rate.   Pulmonary:      Effort: Pulmonary effort is normal. No respiratory distress.      Breath sounds: Normal breath sounds. No wheezing or rales.   Abdominal:      General: Bowel sounds are normal. There is no distension.      Palpations: Abdomen is soft.      Tenderness: There is no abdominal tenderness. There is no guarding or rebound.   Musculoskeletal:         General: Normal range of motion.      Right lower leg: No edema.      Left lower leg: No edema.      Comments: Left lower back, lidocaine patch in place  Nontender.   Skin:     General: Skin is warm and dry.      Findings: No bruising, erythema or rash.   Neurological:      General: No focal deficit present.      Mental Status: She is alert and oriented to person, place, and time. Mental status is at baseline.      Cranial Nerves: No cranial nerve deficit.   Psychiatric:         Mood and Affect: Mood normal.                Discharge instructions/Information to patient and family:   See after visit summary for information provided to patient and family.      Provisions for Follow-Up Care:  See after visit summary for information related to follow-up care and any pertinent home health orders.      Mobility at time of Discharge:   Basic Mobility Inpatient Raw Score: 24  JH-HLM Goal: 8: Walk 250 feet or more  JH-HLM Achieved: 7: Walk 25 feet or more  HLM Goal achieved. Continue to encourage appropriate mobility.     Disposition:   Home    Planned Readmission: No    Discharge Medications:  See after visit summary for reconciled discharge medications provided to patient and/or family.      Administrative Statements   Discharge Statement:  I have spent a total time of 45 minutes in caring for this patient on the day of the visit/encounter. >30 minutes of time was spent on: Risks and benefits of tx options, Instructions for management, Patient and  family education, Impressions, Counseling / Coordination of care, Documenting in the medical record, Reviewing / ordering tests, medicine, procedures  , and Communicating with other healthcare professionals .    **Please Note: This note may have been constructed using a voice recognition system**

## 2025-04-06 NOTE — ASSESSMENT & PLAN NOTE
BP elevated on admission likely in setting of pain  Improving  Continue PTA nifedipine   Optimize pain control as above  Follow-up as outpatient

## 2025-04-06 NOTE — NURSING NOTE
Patient after visit summary reviewed belongings sent with patient and IV removed. Daughter picking up patient.